# Patient Record
Sex: FEMALE | Race: WHITE | NOT HISPANIC OR LATINO | Employment: FULL TIME
[De-identification: names, ages, dates, MRNs, and addresses within clinical notes are randomized per-mention and may not be internally consistent; named-entity substitution may affect disease eponyms.]

---

## 2017-09-03 ENCOUNTER — HEALTH MAINTENANCE LETTER (OUTPATIENT)
Age: 37
End: 2017-09-03

## 2017-10-01 ENCOUNTER — TRANSFERRED RECORDS (OUTPATIENT)
Dept: MULTI SPECIALTY CLINIC | Facility: CLINIC | Age: 37
End: 2017-10-01

## 2017-10-01 LAB — PAP SMEAR - HIM PATIENT REPORTED: NEGATIVE

## 2019-05-29 LAB
HPV ABSTRACT: NORMAL
PAP SMEAR - HIM PATIENT REPORTED: NEGATIVE

## 2020-01-17 ENCOUNTER — OFFICE VISIT (OUTPATIENT)
Dept: FAMILY MEDICINE | Facility: CLINIC | Age: 40
End: 2020-01-17
Payer: COMMERCIAL

## 2020-01-17 VITALS
TEMPERATURE: 97.9 F | HEART RATE: 68 BPM | OXYGEN SATURATION: 100 % | WEIGHT: 143 LBS | HEIGHT: 69 IN | DIASTOLIC BLOOD PRESSURE: 70 MMHG | SYSTOLIC BLOOD PRESSURE: 108 MMHG | BODY MASS INDEX: 21.18 KG/M2

## 2020-01-17 DIAGNOSIS — E03.9 HYPOTHYROIDISM, UNSPECIFIED TYPE: Primary | ICD-10-CM

## 2020-01-17 PROCEDURE — 99203 OFFICE O/P NEW LOW 30 MIN: CPT | Performed by: PHYSICIAN ASSISTANT

## 2020-01-17 RX ORDER — LEVOTHYROXINE SODIUM 50 UG/1
50 TABLET ORAL DAILY
Qty: 90 TABLET | Refills: 3 | Status: SHIPPED | OUTPATIENT
Start: 2020-01-17 | End: 2020-11-08

## 2020-01-17 RX ORDER — ERGOCALCIFEROL 1.25 MG/1
50000 CAPSULE, LIQUID FILLED ORAL WEEKLY
COMMUNITY
End: 2022-02-25

## 2020-01-17 RX ORDER — LEVOTHYROXINE SODIUM 50 UG/1
50 TABLET ORAL
COMMUNITY
Start: 2018-11-09 | End: 2020-01-17

## 2020-01-17 RX ORDER — MULTIVITAMIN WITH IRON
1 TABLET ORAL DAILY
COMMUNITY
End: 2022-07-06

## 2020-01-17 ASSESSMENT — MIFFLIN-ST. JEOR: SCORE: 1385.08

## 2020-01-17 NOTE — Clinical Note
Please abstract the following data from this visit with this patient into the appropriate field in Epic:Tests that can be patient reported without a hard copy:Pap smear done on this date: 10/2017 (approximately), by this group: Rito sullivan, results were Normal

## 2020-02-23 ENCOUNTER — HEALTH MAINTENANCE LETTER (OUTPATIENT)
Age: 40
End: 2020-02-23

## 2020-03-09 ENCOUNTER — OFFICE VISIT (OUTPATIENT)
Dept: FAMILY MEDICINE | Facility: CLINIC | Age: 40
End: 2020-03-09
Payer: COMMERCIAL

## 2020-03-09 ENCOUNTER — NURSE TRIAGE (OUTPATIENT)
Dept: NURSING | Facility: CLINIC | Age: 40
End: 2020-03-09

## 2020-03-09 VITALS
BODY MASS INDEX: 21.28 KG/M2 | HEART RATE: 58 BPM | SYSTOLIC BLOOD PRESSURE: 94 MMHG | DIASTOLIC BLOOD PRESSURE: 62 MMHG | TEMPERATURE: 96.6 F | WEIGHT: 142 LBS

## 2020-03-09 DIAGNOSIS — E03.9 HYPOTHYROIDISM, UNSPECIFIED TYPE: ICD-10-CM

## 2020-03-09 DIAGNOSIS — J01.00 ACUTE MAXILLARY SINUSITIS, RECURRENCE NOT SPECIFIED: Primary | ICD-10-CM

## 2020-03-09 PROCEDURE — 84443 ASSAY THYROID STIM HORMONE: CPT | Performed by: PHYSICIAN ASSISTANT

## 2020-03-09 PROCEDURE — 99213 OFFICE O/P EST LOW 20 MIN: CPT | Performed by: NURSE PRACTITIONER

## 2020-03-09 PROCEDURE — 36415 COLL VENOUS BLD VENIPUNCTURE: CPT | Performed by: PHYSICIAN ASSISTANT

## 2020-03-09 NOTE — PATIENT INSTRUCTIONS
1. Heat  2. Sudafed  3. Flonase  4. Mucinex or antihistamine    5. Fill Augmentin on Friday if no better (sooner if you get worse before then).

## 2020-03-09 NOTE — PROGRESS NOTES
Subjective     Kaley Hart is a 39 year old female who presents to clinic today for the following health issues:      Acute Illness   Acute illness concerns: sinus congestion   Onset:  Last wed     Fever: no    Chills/Sweats: no    Headache (location?): YES    Sinus Pressure:YES    Conjunctivitis:  no    Ear Pain: YES: both    Rhinorrhea: no    Congestion: no    Sore Throat: YES     Cough: YES    Wheeze: no    Decreased Appetite: YES    Nausea: no    Vomiting: no    Diarrhea:  no    Dysuria/Freq.: no    Fatigue/Achiness: YES    Sick/Strep Exposure: no, but notes she was at the airport last week and is traveling tomorrow      Therapies Tried and outcome: sudafed and nyquil     HPI: Kaley presents today with the complaints of headache, sinus pressure, sore throat, ear pain, dry cough. She denies fever, chills, body aches, wheeze, or SOB. Her symptoms started 6 days ago, and she reports that she feels better today than she did at illness onset. OTC remedies minimally helpful.      Patient Active Problem List   Diagnosis     Contraceptive management     Other acne     Hematuria     Past Surgical History:   Procedure Laterality Date     NO HISTORY OF SURGERY         Social History     Tobacco Use     Smoking status: Never Smoker     Smokeless tobacco: Never Used   Substance Use Topics     Alcohol use: Yes     Comment: occasionally     Family History   Problem Relation Age of Onset     Other Cancer Mother         ovarian     Skin Cancer Mother      Diabetes Father              Reviewed and updated as needed this visit by Provider  Tobacco  Allergies  Meds  Problems  Med Hx  Surg Hx  Fam Hx         Review of Systems   ROS COMP: Constitutional, HEENT, pulmonary systems are negative, except as otherwise noted.      Objective    BP 94/62 (BP Location: Left arm, Patient Position: Chair, Cuff Size: Adult Regular)   Pulse 58   Temp 96.6  F (35.9  C) (Tympanic)   Wt 64.4 kg (142 lb)   LMP 02/23/2020    BMI 21.28 kg/m    Body mass index is 21.28 kg/m .  Physical Exam   GENERAL: healthy, alert and no distress  HENT: ear canals normal; TMs- bilaterally with bulge and mucoid fluid noted behind; nose and mouth without ulcers or lesions  NECK: no adenopathy, no asymmetry, masses, or scars and thyroid normal to palpation  RESP: Lungs clear to auscultation - no rales, rhonchi or wheezes; No findings to suggest focal / lobar consolidation; respiratory  CV: regular rate and rhythm, normal S1 S2, no S3 or S4, no murmur, click or rub, no peripheral edema and peripheral pulses strong  NEURO: Normal strength and tone, mentation intact and speech normal    Diagnostic Test Results:  none         Assessment & Plan     Kaley was seen today for sinus problem.    Diagnoses and all orders for this visit:    Acute maxillary sinusitis, recurrence not specified  Comment: No red flags to prompt suspicion of pneumonia or other potentially-invasive respiratory process. History and exam suggest URI / sinusitis of indeterminate etiology. Given symptoms have been present for only 6 days, I favor viral etiology. However, I have prescribed an on-call Augmentin Rx to her pharmacy to be filled only if her symptoms persist beyond another 4-5 days (she agrees to wait to fill this). Symptomatic cares (see patient instructions) and follow up precautions discussed in detail. Vitally-stable, afebrile, non-toxic in appearance, and with good oxygenation today. Kaley acknowledges and demonstrates understanding of circumstances under which care should be sought urgently or emergently. Follow up as discussed. Discussed risks, benefits, alternatives, potential side effects, and proper administration of new medication / treatment. Agrees with plan of care. All questions answered.   -     amoxicillin-clavulanate (AUGMENTIN) 875-125 MG tablet; Take 1 tablet by mouth 2 times daily for 7 days        See Patient Instructions    Return in about 1 week (around  3/16/2020) for persistent or worsening symptoms.    Merritt Murphy NP  [facility] [place]

## 2020-03-09 NOTE — TELEPHONE ENCOUNTER
Caller has sinus congestion and earache and wants to be seen before she leaves tomorrow  [place]   no  international travel or exposure for [place]    Triage protocol reviewed   advised to be seen within  24 hrs ; call transferred to  to arrange  clinic appointment ; advised to gabriella  Seen in [facility]   understand and will comply   Call transferred to    Floresita Santillan RN  FNA      Additional Information    Negative: Difficulty breathing, and not from stuffy nose (e.g., not relieved by cleaning out the nose)    Negative: Sounds like a life-threatening emergency to the triager    Negative: SEVERE headache and has fever    Negative: Patient sounds very sick or weak to the triager    Negative: SEVERE sinus pain    Negative: Severe headache    Negative: Redness or swelling on the cheek, forehead, or around the eye    Negative: Fever > 103 F (39.4 C)    Negative: Fever > 101 F (38.3 C) and over 60 years of age    Negative: Fever > 100.0 F (37.8 C) and has diabetes mellitus or a weak immune system (e.g., HIV positive, cancer chemotherapy, organ transplant, splenectomy, chronic steroids)    Negative: Fever > 100.0 F (37.8 C) and bedridden (e.g., nursing home patient, stroke, chronic illness, recovering from surgery)    Negative: Fever present > 3 days (72 hours)    Negative: Fever returns after gone for over 24 hours and symptoms worse or not improved    Negative: Sinus pain (not just congestion) and fever    Earache    Protocols used: SINUS PAIN AND CONGESTION-A-OH

## 2020-03-10 LAB — TSH SERPL DL<=0.005 MIU/L-ACNC: 1.55 MU/L (ref 0.4–4)

## 2020-03-12 NOTE — RESULT ENCOUNTER NOTE
Kaley-  Here are your recent results.     Your TSH ( thyroid stimulating hormone) is normal indicating that you are taking the appropriate dose of medications. Please continue your current dose and follow up in 1 year    If you have any questions please do not hesitate to contact our office via phone ([phone]) or you may send me a message via [employer] by clicking the contact my Care Team link.      It was a pleasure meeting you and participating in your care!    Thank you,    Surjit Ha MPH, PA-C  [address] [place] [address] [phone]

## 2020-05-26 ENCOUNTER — OFFICE VISIT (OUTPATIENT)
Dept: FAMILY MEDICINE | Facility: CLINIC | Age: 40
End: 2020-05-26
Payer: COMMERCIAL

## 2020-05-26 ENCOUNTER — NURSE TRIAGE (OUTPATIENT)
Dept: NURSING | Facility: CLINIC | Age: 40
End: 2020-05-26

## 2020-05-26 VITALS
WEIGHT: 146 LBS | HEART RATE: 56 BPM | DIASTOLIC BLOOD PRESSURE: 60 MMHG | SYSTOLIC BLOOD PRESSURE: 110 MMHG | OXYGEN SATURATION: 98 % | BODY MASS INDEX: 21.88 KG/M2 | TEMPERATURE: 98.7 F

## 2020-05-26 DIAGNOSIS — H60.333 ACUTE SWIMMER'S EAR OF BOTH SIDES: Primary | ICD-10-CM

## 2020-05-26 PROCEDURE — 99213 OFFICE O/P EST LOW 20 MIN: CPT | Performed by: NURSE PRACTITIONER

## 2020-05-26 RX ORDER — NEOMYCIN SULFATE, POLYMYXIN B SULFATE, HYDROCORTISONE 3.5; 10000; 1 MG/ML; [USP'U]/ML; MG/ML
3 SOLUTION/ DROPS AURICULAR (OTIC) 4 TIMES DAILY
Qty: 10 ML | Refills: 1 | Status: SHIPPED | OUTPATIENT
Start: 2020-05-26 | End: 2020-06-02

## 2020-05-26 RX ORDER — LACTOBACILLUS RHAMNOSUS GG 10B CELL
1 CAPSULE ORAL 2 TIMES DAILY
COMMUNITY
End: 2023-06-29

## 2020-05-26 NOTE — TELEPHONE ENCOUNTER
"    Call form pt       CC:  Ear pain - both ears       She is not sure if allergy related        Initially R ear - muffled - then the L     Now both are \"plugged and painful\"      Took some OTC allergy meds this am - seems to be a little bit better     She has also tried some H2O2 to clean out as well      Pain is 4 /10 now but has been what she would describe as   \"Shooting pains\" in both ears          A/P:   >  OK for visit today  - over to scheduling - will see if provider is able to see as \"in-person\"  And arrange for that if possible     > care advice as noted otherwise -       Ron Baugh RN     COVID 19 Nurse Triage Plan/Patient Instructions    Please be aware that novel coronavirus (COVID-19) may be circulating in the community. If you develop symptoms such as fever, cough, or SOB or if you have concerns about the presence of another infection including coronavirus (COVID-19), please contact your health care provider or visit [email or web address].     Disposition/Instructions    Patient to have scheduled Telephone Visit with a provider. Follow System Ambulatory Workflow for COVID 19.     The clinic staff will assist you to schedule an appointment to complete the Telephone Visit with a provider during normal clinic hours.       Call Back If: Your symptoms worsen before you are able to complete your Telephone Visit with a provider.    Thank you for limiting contact with others, wearing a simple mask to cover your cough, practice good hand hygiene habits and accessing our virtual services where possible to limit the spread of this virus.    For more information about COVID19 and options for caring for yourself at home, please visit the CDC website at https://www.cdc.gov/coronavirus/2019-ncov/about/steps-when-sick.html  For more options for care at [facility], please visit our website at [email or web address]    For more information, please use the [place] Department of Health " COVID-19 Website: https://[email or web address]/diseases/coronavirus/index.html  [place] Department of Health ([facility]) COVID-19 Hotlines (Interpreters available):      Health questions: Phone Number: [phone] or [phone] and Hours: 7 a.m. to 7 p.m.    Schools and  questions: Phone Number: [phone] or [phone] and Hours 7 a.m. to 7 p.m.                       Additional Information    Negative: Sounds like a life-threatening emergency to the triager    Negative: Moving the earlobe or touching the ear clearly increases the pain    Negative: Pink or red swelling behind the ear    Negative: Stiff neck (can't touch chin to chest)    Negative: Patient sounds very sick or weak to the triager    Severe earache pain    Protocols used: EARACHE-A-OH

## 2020-05-26 NOTE — PROGRESS NOTES
Subjective     Kaley Hart is a 39 year old female who presents to clinic today for the following health issues:      Acute Illness   Acute illness concerns:  Ear pain   Onset:  Last week     Fever: no     Chills/Sweats: no     Headache (location?): no     Sinus Pressure:no    Conjunctivitis:  No but watery eyes     Ear Pain: YES: bilateral    Rhinorrhea: no     Congestion: no     Sore Throat: no      Cough: no    Wheeze: no     Decreased Appetite: no     Nausea: no     Vomiting: no     Diarrhea:  no     Dysuria/Freq.: no     Fatigue/Achiness: no     Sick/Strep Exposure: no      Therapies Tried and outcome: allergy medication     HPI: Kaley presents today with the complaint of bilateral ear pain. She first noted this about a week ago and it has gotten progressively worse since onset. No recent history of swimming or retained water in her ears. No recent URI. No drainage. She does find that her hearing is less acute. Pain is located behind ears and within canals. No respiratory complaints. No fever, chills.     Patient Active Problem List   Diagnosis     Contraceptive management     Other acne     Hematuria     Past Surgical History:   Procedure Laterality Date     NO HISTORY OF SURGERY         Social History     Tobacco Use     Smoking status: Never Smoker     Smokeless tobacco: Never Used   Substance Use Topics     Alcohol use: Yes     Comment: occasionally     Family History   Problem Relation Age of Onset     Other Cancer Mother         ovarian     Skin Cancer Mother      Diabetes Father            Reviewed and updated as needed this visit by Provider  Tobacco  Allergies  Meds  Problems  Med Hx  Surg Hx  Fam Hx         Review of Systems   Constitutional, HEENT, pulmonary, neuro, skin systems are negative, except as otherwise noted.      Objective    /60   Pulse 56   Temp 98.7  F (37.1  C) (Tympanic)   Wt 66.2 kg (146 lb)   LMP 05/23/2020   SpO2 98%   BMI 21.88 kg/m    Body mass  index is 21.88 kg/m .  Physical Exam   GENERAL: healthy, alert and no distress  HENT: Ear canals erythematous with with yellowish, clumpy discharge filling them; TMs unable to be visualized; No nose and mouth without ulcers or lesions  NECK: no adenopathy, no asymmetry, masses, or scars and thyroid normal to palpation  RESP: lungs clear to auscultation - no rales, rhonchi or wheezes  CV: regular rate and rhythm, normal S1 S2, no S3 or S4, no murmur, click or rub, no peripheral edema and peripheral pulses strong  NEURO: Normal strength and tone, mentation intact and speech normal    Diagnostic Test Results:  Labs reviewed in Epic        Assessment & Plan     Kaley was seen today for ear problem.    Diagnoses and all orders for this visit:    Acute swimmer's ear of both sides  Comment: Exam suggests otitis externa. Will treat with topical antibiotic for one week. Unfortunately, I was unable to visualize her TMs, so I wasn't able to assess for AOM. She will try the drops and follow up in a few days if she fails to note relief. Discussed reasons to call or return to clinic. Kaley acknowledges and demonstrates understanding of circumstances under which care should be sought urgently or emergently. Follow up as discussed. Discussed risks, benefits, alternatives, potential side effects, and proper administration of new medication / treatment. Agrees with plan of care. All questions answered.   -     neomycin-polymyxin-hydrocortisone (CORTISPORIN) 3.5-48827-7 otic solution; Place 3 drops in ear(s) 4 times daily for 7 days      See Patient Instructions    Return in about 1 week (around 6/2/2020) for persistent or worsening symptoms.    Merritt Murphy NP  [facility] [place]

## 2020-06-03 ENCOUNTER — MYC MEDICAL ADVICE (OUTPATIENT)
Dept: FAMILY MEDICINE | Facility: CLINIC | Age: 40
End: 2020-06-03

## 2020-06-29 NOTE — PROGRESS NOTES
Subjective     Kaley Hart is a 39 year old female who presents to clinic today for the following health issues:      Concern - Pt has ongoing bilateral ear fullness/pain as previously in OV on 6/3     HPI: Kaley presents today for bilateral ear pain / fullness. She was seen in late May for bilateral infective otitis externa. This resolved with a course of Cortisporin drops. However, she recently noted the development of bilateral ear pain / fullness and sinus pressure / pain. She feels like these symptoms may be allergy-driven. She is taking Zyrtec and Flonase at present, but these don't appear to be helping much. No fever, chills.     Patient Active Problem List   Diagnosis     Contraceptive management     Other acne     Hematuria     Past Surgical History:   Procedure Laterality Date     NO HISTORY OF SURGERY         Social History     Tobacco Use     Smoking status: Never Smoker     Smokeless tobacco: Never Used   Substance Use Topics     Alcohol use: Yes     Comment: occasionally     Family History   Problem Relation Age of Onset     Other Cancer Mother         ovarian     Skin Cancer Mother      Diabetes Father            Reviewed and updated as needed this visit by Provider  Tobacco  Allergies  Meds  Problems  Med Hx  Surg Hx  Fam Hx         Review of Systems   Constitutional, HEENT, pulmonary, neuro systems are negative, except as otherwise noted.      Objective    BP 96/62 (BP Location: Left arm, Patient Position: Chair, Cuff Size: Adult Regular)   Pulse 66   Temp 97.3  F (36.3  C) (Tympanic)   Wt 67.1 kg (148 lb)   LMP 06/23/2020   BMI 22.18 kg/m    Body mass index is 22.18 kg/m .  Physical Exam   GENERAL: healthy, alert and no distress  HENT: ear canals normal; TMs- bilaterally with bulge and mucoid fluid noted behind; nose and mouth without ulcers or lesions  NECK: no adenopathy, no asymmetry, masses, or scars and thyroid normal to palpation  RESP: lungs clear to auscultation -  no rales, rhonchi or wheezes  CV: regular rate and rhythm, normal S1 S2, no S3 or S4, no murmur, click or rub, no peripheral edema and peripheral pulses strong  NEURO: Normal strength and tone, mentation intact and speech normal    Diagnostic Test Results:  Labs reviewed in Epic        Assessment & Plan     Kaley was seen today for ear problem.    Diagnoses and all orders for this visit:    Middle ear effusion, bilateral  Comment: No evidence of infectious process behind TMs. Otitis externa also resolved. Presumably-sterile effusions noted. Suggested antihistamine, oral decongestant, Flonase, and heat for a couple weeks. If no resolution, she will see ENT for further workup / treatment. Discussed reasons to call or return to clinic. Kaley acknowledges and demonstrates understanding of circumstances under which care should be sought urgently or emergently. Follow up as discussed.   -     OTOLARYNGOLOGY REFERRAL      See Patient Instructions    Return in about 3 weeks (around 7/21/2020).    Merritt Murphy NP  [facility] [place]

## 2020-06-29 NOTE — TELEPHONE ENCOUNTER
Please see [employer] message below and advise.   Nancy Lopez RN   [facility] - Triage

## 2020-06-30 ENCOUNTER — OFFICE VISIT (OUTPATIENT)
Dept: FAMILY MEDICINE | Facility: CLINIC | Age: 40
End: 2020-06-30
Payer: COMMERCIAL

## 2020-06-30 VITALS
BODY MASS INDEX: 22.18 KG/M2 | WEIGHT: 148 LBS | HEART RATE: 66 BPM | TEMPERATURE: 97.3 F | DIASTOLIC BLOOD PRESSURE: 62 MMHG | SYSTOLIC BLOOD PRESSURE: 96 MMHG

## 2020-06-30 DIAGNOSIS — H65.93 MIDDLE EAR EFFUSION, BILATERAL: Primary | ICD-10-CM

## 2020-06-30 PROCEDURE — 99213 OFFICE O/P EST LOW 20 MIN: CPT | Performed by: NURSE PRACTITIONER

## 2020-11-06 ENCOUNTER — OFFICE VISIT (OUTPATIENT)
Dept: FAMILY MEDICINE | Facility: CLINIC | Age: 40
End: 2020-11-06
Payer: COMMERCIAL

## 2020-11-06 VITALS
WEIGHT: 147 LBS | TEMPERATURE: 98.2 F | DIASTOLIC BLOOD PRESSURE: 58 MMHG | OXYGEN SATURATION: 100 % | SYSTOLIC BLOOD PRESSURE: 96 MMHG | BODY MASS INDEX: 21.77 KG/M2 | HEART RATE: 73 BPM | HEIGHT: 69 IN

## 2020-11-06 DIAGNOSIS — E06.3 HYPOTHYROIDISM DUE TO HASHIMOTO'S THYROIDITIS: ICD-10-CM

## 2020-11-06 DIAGNOSIS — N39.3 FEMALE STRESS INCONTINENCE: ICD-10-CM

## 2020-11-06 DIAGNOSIS — L84 CORN OR CALLUS: ICD-10-CM

## 2020-11-06 DIAGNOSIS — Z23 NEED FOR PROPHYLACTIC VACCINATION AND INOCULATION AGAINST INFLUENZA: ICD-10-CM

## 2020-11-06 DIAGNOSIS — E03.9 HYPOTHYROIDISM, UNSPECIFIED TYPE: ICD-10-CM

## 2020-11-06 DIAGNOSIS — Z00.00 ENCOUNTER FOR ROUTINE ADULT HEALTH EXAMINATION WITHOUT ABNORMAL FINDINGS: Primary | ICD-10-CM

## 2020-11-06 DIAGNOSIS — Z13.1 SCREENING FOR DIABETES MELLITUS: ICD-10-CM

## 2020-11-06 DIAGNOSIS — Z13.220 SCREENING FOR LIPID DISORDERS: ICD-10-CM

## 2020-11-06 LAB
CHOLEST SERPL-MCNC: 163 MG/DL
GLUCOSE SERPL-MCNC: 87 MG/DL (ref 70–99)
HDLC SERPL-MCNC: 49 MG/DL
LDLC SERPL CALC-MCNC: 101 MG/DL
NONHDLC SERPL-MCNC: 114 MG/DL
T4 FREE SERPL-MCNC: 1.44 NG/DL (ref 0.76–1.46)
TRIGL SERPL-MCNC: 65 MG/DL
TSH SERPL DL<=0.005 MIU/L-ACNC: 0.14 MU/L (ref 0.4–4)

## 2020-11-06 PROCEDURE — 90686 IIV4 VACC NO PRSV 0.5 ML IM: CPT | Performed by: NURSE PRACTITIONER

## 2020-11-06 PROCEDURE — 99213 OFFICE O/P EST LOW 20 MIN: CPT | Mod: 25 | Performed by: NURSE PRACTITIONER

## 2020-11-06 PROCEDURE — 99395 PREV VISIT EST AGE 18-39: CPT | Mod: 25 | Performed by: NURSE PRACTITIONER

## 2020-11-06 PROCEDURE — 36415 COLL VENOUS BLD VENIPUNCTURE: CPT | Performed by: NURSE PRACTITIONER

## 2020-11-06 PROCEDURE — 84439 ASSAY OF FREE THYROXINE: CPT | Performed by: NURSE PRACTITIONER

## 2020-11-06 PROCEDURE — 90471 IMMUNIZATION ADMIN: CPT | Performed by: NURSE PRACTITIONER

## 2020-11-06 PROCEDURE — 84443 ASSAY THYROID STIM HORMONE: CPT | Performed by: NURSE PRACTITIONER

## 2020-11-06 PROCEDURE — 82947 ASSAY GLUCOSE BLOOD QUANT: CPT | Performed by: NURSE PRACTITIONER

## 2020-11-06 PROCEDURE — 80061 LIPID PANEL: CPT | Performed by: NURSE PRACTITIONER

## 2020-11-06 ASSESSMENT — MIFFLIN-ST. JEOR: SCORE: 1403.23

## 2020-11-06 NOTE — PROGRESS NOTES
"   SUBJECTIVE:   CC: Kaely Hart is an 39 year old woman who presents for preventive health visit.     Patient has been advised of split billing requirements and indicates understanding: Yes  Healthy Habits:    Getting at least 3 servings of Calcium per day:  Yes    Bi-annual eye exam:  Yes    Dental care twice a year:  Yes    Sleep apnea or symptoms of sleep apnea:  None    Diet:  Regular (no restrictions)    Frequency of exercise:  1 day/week    Taking medications regularly:  Yes    Barriers to taking medications:  None    Medication side effects:  None    PHQ-2 Total Score:    HPI:     Kaley would also like to discuss two other issues.     1. Hard, painful lumps over her big toe IP joints bilaterally. She was hiking last year and lost her toenails due to friction with footwear. Her nails have finally grown back in (took one year), but now she is struggling with painful growths over her \"knuckles\" of her great toes bilaterally. No bleeding or drainage.     2. Since her last pregnancy, she has been struggling with some stress incontinence. She states that it get particularly bad while working out, so she often works out at home. No dysuria, hematuria, or other symptoms of UTI.     Today's PHQ-2 Score:   PHQ-2 ( 1999 Pfizer) 11/6/2020   Q1: Little interest or pleasure in doing things 0   Q2: Feeling down, depressed or hopeless 0   PHQ-2 Score 0   Q1: Little interest or pleasure in doing things Not at all   Q2: Feeling down, depressed or hopeless Not at all   PHQ-2 Score 0       Abuse: Current or Past (Physical, Sexual or Emotional) - No  Do you feel safe in your environment? Yes        Social History     Tobacco Use     Smoking status: Never Smoker     Smokeless tobacco: Never Used   Substance Use Topics     Alcohol use: Yes     Comment: occasionally     If you drink alcohol do you typically have >3 drinks per day or >7 drinks per week? No    Alcohol Use 11/6/2020   Prescreen: >3 drinks/day or >7 " "drinks/week? No   Prescreen: >3 drinks/day or >7 drinks/week? -   No flowsheet data found.    Reviewed orders with patient.  Reviewed health maintenance and updated orders accordingly - Yes  Lab work is in process      Pertinent mammograms are reviewed under the imaging tab.  History of abnormal Pap smear: NO - age 30-65 PAP every 5 years with negative HPV co-testing recommended     Reviewed and updated as needed this visit by clinical staff  Tobacco  Allergies  Meds  Problems  Med Hx  Surg Hx  Fam Hx          Reviewed and updated as needed this visit by Provider  Tobacco  Allergies  Meds  Problems  Med Hx  Surg Hx  Fam Hx             Review of Systems  CONSTITUTIONAL: NEGATIVE for fever, chills, change in weight  INTEGUMENTARU/SKIN: NEGATIVE for worrisome rashes, moles or lesions  EYES: NEGATIVE for vision changes or irritation  ENT: NEGATIVE for ear, mouth and throat problems  RESP: NEGATIVE for significant cough or SOB  BREAST: NEGATIVE for masses, tenderness or discharge  CV: NEGATIVE for chest pain, palpitations or peripheral edema  GI: NEGATIVE for nausea, abdominal pain, heartburn, or change in bowel habits  : See HPI  MUSCULOSKELETAL: See HPI  NEURO: NEGATIVE for weakness, dizziness or paresthesias  PSYCHIATRIC: NEGATIVE for changes in mood or affect     OBJECTIVE:   BP 96/58   Pulse 73   Temp 98.2  F (36.8  C) (Tympanic)   Ht 1.74 m (5' 8.5\")   Wt 66.7 kg (147 lb)   LMP 10/16/2020   SpO2 100%   BMI 22.03 kg/m    Physical Exam  GENERAL: healthy, alert and no distress  EYES: Eyes grossly normal to inspection, PERRL and conjunctivae and sclerae normal  HENT: ear canals and TM's normal, nose and mouth without ulcers or lesions  NECK: no adenopathy, no asymmetry, masses, or scars and thyroid normal to palpation  RESP: lungs clear to auscultation - no rales, rhonchi or wheezes  CV: regular rate and rhythm, normal S1 S2, no S3 or S4, no murmur, click or rub, no peripheral edema and " peripheral pulses strong  ABDOMEN: soft, nontender, no hepatosplenomegaly, no masses and bowel sounds normal  MS: no gross musculoskeletal defects noted, no edema  SKIN: Great toes bilaterally - [place], tender keratinized masses directly over IP joints. No bleeding or drainage. Appearance consistent with corn / callus versus mucus cyst.  NEURO: Normal strength and tone, mentation intact and speech normal  PSYCH: mentation appears normal, affect normal/bright    Diagnostic Test Results:  Labs reviewed in Epic  No results found for this or any previous visit (from the past 24 hour(s)).    ASSESSMENT/PLAN:   Kaley was seen today for physical and imm/inj.    Diagnoses and all orders for this visit:    Encounter for routine adult health examination without abnormal findings    Corn or callus  Comment: Viral wart(s) unlikely given appearance and bilateral nature (and because they are found over a high-friction surface). Could be mucus cysts, but stratified / keratinized appearance favors corn / callus. Suggest softening skin (file and soak) and using OTC remedies and pads first. If this is not effective, will consider in-office destruction with LN2 versus podiatry referral. She agrees with this approach.     Female stress incontinence  Comment: Likely due to pelvic floor muscle changes after childbirth. Recommended pelvic floor therapy. She will reach out to me in the coming weeks and ask for this referral if she is not able to see her OB/GYN imminently.     Hypothyroidism due to Hashimoto's thyroiditis  -     TSH with free T4 reflex    Screening for diabetes mellitus  -     Glucose    Screening for lipid disorders  -     Lipid panel reflex to direct LDL Fasting    Need for prophylactic vaccination and inoculation against influenza  -     INFLUENZA VACCINE IM > 6 MONTHS VALENT IIV4 [38970]        Patient has been advised of split billing requirements and indicates understanding: Yes  COUNSELING:  Reviewed preventive  "health counseling, as reflected in patient instructions    Estimated body mass index is 22.03 kg/m  as calculated from the following:    Height as of this encounter: 1.74 m (5' 8.5\").    Weight as of this encounter: 66.7 kg (147 lb).        She reports that she has never smoked. She has never used smokeless tobacco.      Counseling Resources:  ATP IV Guidelines  Pooled Cohorts Equation Calculator  Breast Cancer Risk Calculator  BRCA-Related Cancer Risk Assessment: FHS-7 Tool  FRAX Risk Assessment  ICSI Preventive Guidelines  Dietary Guidelines for Americans, 2010  USDA's MyPlate  ASA Prophylaxis  Lung CA Screening    Merritt Murphy NP  [facility]  "

## 2020-11-08 RX ORDER — LEVOTHYROXINE SODIUM 25 UG/1
25 TABLET ORAL DAILY
Qty: 90 TABLET | Refills: 0 | Status: SHIPPED | OUTPATIENT
Start: 2020-11-08 | End: 2021-02-06

## 2020-11-21 ENCOUNTER — MYC MEDICAL ADVICE (OUTPATIENT)
Dept: FAMILY MEDICINE | Facility: CLINIC | Age: 40
End: 2020-11-21

## 2020-11-23 NOTE — TELEPHONE ENCOUNTER
Routing to Merritt Murphy, have you seen this form?    .Kalpana STEEN    [facility] [place]

## 2020-11-24 NOTE — TELEPHONE ENCOUNTER
Patient sent form, form has been completed and signed scanned to medical records and sent to patient.    .Kalpana STEEN    [facility] [place]

## 2021-01-15 ENCOUNTER — HEALTH MAINTENANCE LETTER (OUTPATIENT)
Age: 41
End: 2021-01-15

## 2021-01-30 ENCOUNTER — HOSPITAL ENCOUNTER (OUTPATIENT)
Facility: CLINIC | Age: 41
Discharge: HOME | End: 2021-01-30
Attending: INTERNAL MEDICINE | Admitting: SURGERY
Payer: COMMERCIAL

## 2021-01-30 ENCOUNTER — ANESTHESIA (OUTPATIENT)
Dept: SURGERY | Facility: CLINIC | Age: 41
End: 2021-01-30
Payer: COMMERCIAL

## 2021-01-30 ENCOUNTER — APPOINTMENT (OUTPATIENT)
Dept: CT IMAGING | Facility: CLINIC | Age: 41
End: 2021-01-30
Attending: INTERNAL MEDICINE
Payer: COMMERCIAL

## 2021-01-30 ENCOUNTER — ANESTHESIA EVENT (OUTPATIENT)
Dept: SURGERY | Facility: CLINIC | Age: 41
End: 2021-01-30
Payer: COMMERCIAL

## 2021-01-30 VITALS
WEIGHT: 146 LBS | SYSTOLIC BLOOD PRESSURE: 115 MMHG | HEART RATE: 68 BPM | TEMPERATURE: 97.7 F | DIASTOLIC BLOOD PRESSURE: 80 MMHG | RESPIRATION RATE: 16 BRPM | OXYGEN SATURATION: 100 % | BODY MASS INDEX: 21.88 KG/M2

## 2021-01-30 DIAGNOSIS — K35.30 ACUTE APPENDICITIS WITH LOCALIZED PERITONITIS, WITHOUT PERFORATION, ABSCESS, OR GANGRENE: Primary | ICD-10-CM

## 2021-01-30 LAB
ALBUMIN UR-MCNC: NEGATIVE MG/DL
AMORPH CRY #/AREA URNS HPF: ABNORMAL /HPF
ANION GAP SERPL CALCULATED.3IONS-SCNC: 4 MMOL/L (ref 3–14)
APPEARANCE UR: ABNORMAL
BASOPHILS # BLD AUTO: 0 10E9/L (ref 0–0.2)
BASOPHILS NFR BLD AUTO: 0.3 %
BILIRUB UR QL STRIP: NEGATIVE
BUN SERPL-MCNC: 12 MG/DL (ref 7–30)
CALCIUM SERPL-MCNC: 8.7 MG/DL (ref 8.5–10.1)
CHLORIDE SERPL-SCNC: 106 MMOL/L (ref 94–109)
CO2 SERPL-SCNC: 28 MMOL/L (ref 20–32)
COLOR UR AUTO: YELLOW
CREAT SERPL-MCNC: 0.82 MG/DL (ref 0.52–1.04)
DIFFERENTIAL METHOD BLD: NORMAL
EOSINOPHIL # BLD AUTO: 0 10E9/L (ref 0–0.7)
EOSINOPHIL NFR BLD AUTO: 0.5 %
ERYTHROCYTE [DISTWIDTH] IN BLOOD BY AUTOMATED COUNT: 11.9 % (ref 10–15)
GFR SERPL CREATININE-BSD FRML MDRD: 90 ML/MIN/{1.73_M2}
GLUCOSE SERPL-MCNC: 87 MG/DL (ref 70–99)
GLUCOSE UR STRIP-MCNC: NEGATIVE MG/DL
HCG SERPL QL: NEGATIVE
HCT VFR BLD AUTO: 39.3 % (ref 35–47)
HGB BLD-MCNC: 13.7 G/DL (ref 11.7–15.7)
HGB UR QL STRIP: NEGATIVE
IMM GRANULOCYTES # BLD: 0 10E9/L (ref 0–0.4)
IMM GRANULOCYTES NFR BLD: 0.3 %
KETONES UR STRIP-MCNC: NEGATIVE MG/DL
LABORATORY COMMENT REPORT: NORMAL
LEUKOCYTE ESTERASE UR QL STRIP: ABNORMAL
LYMPHOCYTES # BLD AUTO: 1.2 10E9/L (ref 0.8–5.3)
LYMPHOCYTES NFR BLD AUTO: 15.7 %
MCH RBC QN AUTO: 32.3 PG (ref 26.5–33)
MCHC RBC AUTO-ENTMCNC: 34.9 G/DL (ref 31.5–36.5)
MCV RBC AUTO: 93 FL (ref 78–100)
MONOCYTES # BLD AUTO: 0.4 10E9/L (ref 0–1.3)
MONOCYTES NFR BLD AUTO: 5.4 %
NEUTROPHILS # BLD AUTO: 6.2 10E9/L (ref 1.6–8.3)
NEUTROPHILS NFR BLD AUTO: 77.8 %
NITRATE UR QL: NEGATIVE
NRBC # BLD AUTO: 0 10*3/UL
NRBC BLD AUTO-RTO: 0 /100
PH UR STRIP: 7.5 PH (ref 5–7)
PLATELET # BLD AUTO: 186 10E9/L (ref 150–450)
POTASSIUM SERPL-SCNC: 3.3 MMOL/L (ref 3.4–5.3)
RBC # BLD AUTO: 4.24 10E12/L (ref 3.8–5.2)
RBC #/AREA URNS AUTO: 1 /HPF (ref 0–2)
SARS-COV-2 RNA RESP QL NAA+PROBE: NEGATIVE
SODIUM SERPL-SCNC: 138 MMOL/L (ref 133–144)
SOURCE: ABNORMAL
SP GR UR STRIP: 1.02 (ref 1–1.03)
SPECIMEN SOURCE: NORMAL
SQUAMOUS #/AREA URNS AUTO: 5 /HPF (ref 0–1)
UROBILINOGEN UR STRIP-MCNC: NORMAL MG/DL (ref 0–2)
WBC # BLD AUTO: 7.9 10E9/L (ref 4–11)
WBC #/AREA URNS AUTO: 4 /HPF (ref 0–5)

## 2021-01-30 PROCEDURE — 258N000001 HC RX 258: Performed by: SURGERY

## 2021-01-30 PROCEDURE — 74176 CT ABD & PELVIS W/O CONTRAST: CPT

## 2021-01-30 PROCEDURE — 250N000011 HC RX IP 250 OP 636: Performed by: INTERNAL MEDICINE

## 2021-01-30 PROCEDURE — 999N000141 HC STATISTIC PRE-PROCEDURE NURSING ASSESSMENT: Performed by: SURGERY

## 2021-01-30 PROCEDURE — 85025 COMPLETE CBC W/AUTO DIFF WBC: CPT | Performed by: INTERNAL MEDICINE

## 2021-01-30 PROCEDURE — 250N000011 HC RX IP 250 OP 636: Performed by: ANESTHESIOLOGY

## 2021-01-30 PROCEDURE — 250N000011 HC RX IP 250 OP 636: Performed by: NURSE ANESTHETIST, CERTIFIED REGISTERED

## 2021-01-30 PROCEDURE — 710N000012 HC RECOVERY PHASE 2, PER MINUTE: Performed by: SURGERY

## 2021-01-30 PROCEDURE — 258N000003 HC RX IP 258 OP 636: Performed by: ANESTHESIOLOGY

## 2021-01-30 PROCEDURE — 250N000009 HC RX 250: Performed by: NURSE ANESTHETIST, CERTIFIED REGISTERED

## 2021-01-30 PROCEDURE — 360N000076 HC SURGERY LEVEL 3, PER MIN: Performed by: SURGERY

## 2021-01-30 PROCEDURE — 250N000009 HC RX 250: Performed by: SURGERY

## 2021-01-30 PROCEDURE — 81001 URINALYSIS AUTO W/SCOPE: CPT | Performed by: INTERNAL MEDICINE

## 2021-01-30 PROCEDURE — 99204 OFFICE O/P NEW MOD 45 MIN: CPT | Mod: 57 | Performed by: SURGERY

## 2021-01-30 PROCEDURE — 96365 THER/PROPH/DIAG IV INF INIT: CPT

## 2021-01-30 PROCEDURE — 250N000013 HC RX MED GY IP 250 OP 250 PS 637: Performed by: SURGERY

## 2021-01-30 PROCEDURE — 84703 CHORIONIC GONADOTROPIN ASSAY: CPT | Performed by: INTERNAL MEDICINE

## 2021-01-30 PROCEDURE — C9803 HOPD COVID-19 SPEC COLLECT: HCPCS

## 2021-01-30 PROCEDURE — 87635 SARS-COV-2 COVID-19 AMP PRB: CPT | Performed by: INTERNAL MEDICINE

## 2021-01-30 PROCEDURE — 80048 BASIC METABOLIC PNL TOTAL CA: CPT | Performed by: INTERNAL MEDICINE

## 2021-01-30 PROCEDURE — 88304 TISSUE EXAM BY PATHOLOGIST: CPT | Mod: 26 | Performed by: PATHOLOGY

## 2021-01-30 PROCEDURE — 710N000009 HC RECOVERY PHASE 1, LEVEL 1, PER MIN: Performed by: SURGERY

## 2021-01-30 PROCEDURE — 370N000017 HC ANESTHESIA TECHNICAL FEE, PER MIN: Performed by: SURGERY

## 2021-01-30 PROCEDURE — 88304 TISSUE EXAM BY PATHOLOGIST: CPT | Mod: TC | Performed by: SURGERY

## 2021-01-30 PROCEDURE — 272N000001 HC OR GENERAL SUPPLY STERILE: Performed by: SURGERY

## 2021-01-30 PROCEDURE — 99285 EMERGENCY DEPT VISIT HI MDM: CPT | Mod: 25

## 2021-01-30 RX ORDER — ONDANSETRON 4 MG/1
4 TABLET, ORALLY DISINTEGRATING ORAL EVERY 30 MIN PRN
Status: DISCONTINUED | OUTPATIENT
Start: 2021-01-30 | End: 2021-01-30 | Stop reason: HOSPADM

## 2021-01-30 RX ORDER — NALOXONE HYDROCHLORIDE 0.4 MG/ML
0.2 INJECTION, SOLUTION INTRAMUSCULAR; INTRAVENOUS; SUBCUTANEOUS
Status: DISCONTINUED | OUTPATIENT
Start: 2021-01-30 | End: 2021-01-30 | Stop reason: HOSPADM

## 2021-01-30 RX ORDER — ACETAMINOPHEN 325 MG/1
650 TABLET ORAL
Status: DISCONTINUED | OUTPATIENT
Start: 2021-01-30 | End: 2021-01-30 | Stop reason: HOSPADM

## 2021-01-30 RX ORDER — NALOXONE HYDROCHLORIDE 0.4 MG/ML
0.4 INJECTION, SOLUTION INTRAMUSCULAR; INTRAVENOUS; SUBCUTANEOUS
Status: DISCONTINUED | OUTPATIENT
Start: 2021-01-30 | End: 2021-01-30 | Stop reason: HOSPADM

## 2021-01-30 RX ORDER — HYDROMORPHONE HYDROCHLORIDE 1 MG/ML
0.5 INJECTION, SOLUTION INTRAMUSCULAR; INTRAVENOUS; SUBCUTANEOUS
Status: DISCONTINUED | OUTPATIENT
Start: 2021-01-30 | End: 2021-01-30 | Stop reason: HOSPADM

## 2021-01-30 RX ORDER — PROPOFOL 10 MG/ML
INJECTION, EMULSION INTRAVENOUS CONTINUOUS PRN
Status: DISCONTINUED | OUTPATIENT
Start: 2021-01-30 | End: 2021-01-30

## 2021-01-30 RX ORDER — FENTANYL CITRATE 50 UG/ML
25-50 INJECTION, SOLUTION INTRAMUSCULAR; INTRAVENOUS
Status: DISCONTINUED | OUTPATIENT
Start: 2021-01-30 | End: 2021-01-30 | Stop reason: HOSPADM

## 2021-01-30 RX ORDER — ONDANSETRON 2 MG/ML
4 INJECTION INTRAMUSCULAR; INTRAVENOUS EVERY 30 MIN PRN
Status: DISCONTINUED | OUTPATIENT
Start: 2021-01-30 | End: 2021-01-30 | Stop reason: HOSPADM

## 2021-01-30 RX ORDER — DEXAMETHASONE SODIUM PHOSPHATE 4 MG/ML
INJECTION, SOLUTION INTRA-ARTICULAR; INTRALESIONAL; INTRAMUSCULAR; INTRAVENOUS; SOFT TISSUE PRN
Status: DISCONTINUED | OUTPATIENT
Start: 2021-01-30 | End: 2021-01-30

## 2021-01-30 RX ORDER — MEPERIDINE HYDROCHLORIDE 25 MG/ML
12.5 INJECTION INTRAMUSCULAR; INTRAVENOUS; SUBCUTANEOUS
Status: DISCONTINUED | OUTPATIENT
Start: 2021-01-30 | End: 2021-01-30 | Stop reason: HOSPADM

## 2021-01-30 RX ORDER — PROPOFOL 10 MG/ML
INJECTION, EMULSION INTRAVENOUS PRN
Status: DISCONTINUED | OUTPATIENT
Start: 2021-01-30 | End: 2021-01-30

## 2021-01-30 RX ORDER — SODIUM CHLORIDE, SODIUM LACTATE, POTASSIUM CHLORIDE, CALCIUM CHLORIDE 600; 310; 30; 20 MG/100ML; MG/100ML; MG/100ML; MG/100ML
INJECTION, SOLUTION INTRAVENOUS CONTINUOUS
Status: DISCONTINUED | OUTPATIENT
Start: 2021-01-30 | End: 2021-01-30 | Stop reason: HOSPADM

## 2021-01-30 RX ORDER — OXYCODONE HYDROCHLORIDE 5 MG/1
5-10 TABLET ORAL EVERY 4 HOURS PRN
Qty: 10 TABLET | Refills: 0 | Status: SHIPPED | OUTPATIENT
Start: 2021-01-30 | End: 2021-03-10

## 2021-01-30 RX ORDER — ONDANSETRON 2 MG/ML
INJECTION INTRAMUSCULAR; INTRAVENOUS PRN
Status: DISCONTINUED | OUTPATIENT
Start: 2021-01-30 | End: 2021-01-30

## 2021-01-30 RX ORDER — NEOSTIGMINE METHYLSULFATE 1 MG/ML
VIAL (ML) INJECTION PRN
Status: DISCONTINUED | OUTPATIENT
Start: 2021-01-30 | End: 2021-01-30

## 2021-01-30 RX ORDER — LIDOCAINE HYDROCHLORIDE 10 MG/ML
INJECTION, SOLUTION INFILTRATION; PERINEURAL PRN
Status: DISCONTINUED | OUTPATIENT
Start: 2021-01-30 | End: 2021-01-30

## 2021-01-30 RX ORDER — HYDROMORPHONE HYDROCHLORIDE 1 MG/ML
.3-.5 INJECTION, SOLUTION INTRAMUSCULAR; INTRAVENOUS; SUBCUTANEOUS EVERY 5 MIN PRN
Status: DISCONTINUED | OUTPATIENT
Start: 2021-01-30 | End: 2021-01-30 | Stop reason: HOSPADM

## 2021-01-30 RX ORDER — GLYCOPYRROLATE 0.2 MG/ML
INJECTION, SOLUTION INTRAMUSCULAR; INTRAVENOUS PRN
Status: DISCONTINUED | OUTPATIENT
Start: 2021-01-30 | End: 2021-01-30

## 2021-01-30 RX ORDER — OXYCODONE HYDROCHLORIDE 5 MG/1
5 TABLET ORAL
Status: COMPLETED | OUTPATIENT
Start: 2021-01-30 | End: 2021-01-30

## 2021-01-30 RX ORDER — FENTANYL CITRATE 50 UG/ML
INJECTION, SOLUTION INTRAMUSCULAR; INTRAVENOUS PRN
Status: DISCONTINUED | OUTPATIENT
Start: 2021-01-30 | End: 2021-01-30

## 2021-01-30 RX ORDER — BUPIVACAINE HYDROCHLORIDE AND EPINEPHRINE 5; 5 MG/ML; UG/ML
INJECTION, SOLUTION PERINEURAL PRN
Status: DISCONTINUED | OUTPATIENT
Start: 2021-01-30 | End: 2021-01-30 | Stop reason: HOSPADM

## 2021-01-30 RX ORDER — CEFOXITIN 1 G/1
1 INJECTION, POWDER, FOR SOLUTION INTRAVENOUS ONCE
Status: COMPLETED | OUTPATIENT
Start: 2021-01-30 | End: 2021-01-30

## 2021-01-30 RX ORDER — CEFOXITIN 2 G/1
2 INJECTION, POWDER, FOR SOLUTION INTRAVENOUS
Status: CANCELLED | OUTPATIENT
Start: 2021-01-30

## 2021-01-30 RX ORDER — LABETALOL HYDROCHLORIDE 5 MG/ML
10 INJECTION, SOLUTION INTRAVENOUS EVERY 5 MIN PRN
Status: DISCONTINUED | OUTPATIENT
Start: 2021-01-30 | End: 2021-01-30 | Stop reason: HOSPADM

## 2021-01-30 RX ADMIN — OXYCODONE HYDROCHLORIDE 5 MG: 5 TABLET ORAL at 20:06

## 2021-01-30 RX ADMIN — CEFOXITIN SODIUM 1 G: 1 POWDER, FOR SOLUTION INTRAVENOUS at 14:58

## 2021-01-30 RX ADMIN — ROCURONIUM BROMIDE 40 MG: 10 INJECTION INTRAVENOUS at 18:09

## 2021-01-30 RX ADMIN — DEXAMETHASONE SODIUM PHOSPHATE 4 MG: 4 INJECTION, SOLUTION INTRA-ARTICULAR; INTRALESIONAL; INTRAMUSCULAR; INTRAVENOUS; SOFT TISSUE at 18:09

## 2021-01-30 RX ADMIN — MIDAZOLAM 2 MG: 1 INJECTION INTRAMUSCULAR; INTRAVENOUS at 18:02

## 2021-01-30 RX ADMIN — SODIUM CHLORIDE, POTASSIUM CHLORIDE, SODIUM LACTATE AND CALCIUM CHLORIDE: 600; 310; 30; 20 INJECTION, SOLUTION INTRAVENOUS at 18:55

## 2021-01-30 RX ADMIN — GLYCOPYRROLATE 0.5 MG: 0.2 INJECTION, SOLUTION INTRAMUSCULAR; INTRAVENOUS at 18:34

## 2021-01-30 RX ADMIN — PROPOFOL 160 MG: 10 INJECTION, EMULSION INTRAVENOUS at 18:09

## 2021-01-30 RX ADMIN — PROPOFOL 45 MCG/KG/MIN: 10 INJECTION, EMULSION INTRAVENOUS at 18:17

## 2021-01-30 RX ADMIN — LIDOCAINE HYDROCHLORIDE 50 MG: 10 INJECTION, SOLUTION INFILTRATION; PERINEURAL at 18:09

## 2021-01-30 RX ADMIN — FENTANYL CITRATE 100 MCG: 50 INJECTION, SOLUTION INTRAMUSCULAR; INTRAVENOUS at 18:09

## 2021-01-30 RX ADMIN — Medication 2.5 MG: at 18:34

## 2021-01-30 RX ADMIN — FENTANYL CITRATE 50 MCG: 50 INJECTION, SOLUTION INTRAMUSCULAR; INTRAVENOUS at 19:22

## 2021-01-30 RX ADMIN — HYDROMORPHONE HYDROCHLORIDE 0.5 MG: 1 INJECTION, SOLUTION INTRAMUSCULAR; INTRAVENOUS; SUBCUTANEOUS at 18:21

## 2021-01-30 RX ADMIN — MEPERIDINE HYDROCHLORIDE 12.5 MG: 25 INJECTION INTRAMUSCULAR; INTRAVENOUS; SUBCUTANEOUS at 19:16

## 2021-01-30 RX ADMIN — GLYCOPYRROLATE 0.2 MG: 0.2 INJECTION, SOLUTION INTRAMUSCULAR; INTRAVENOUS at 18:09

## 2021-01-30 RX ADMIN — ONDANSETRON HYDROCHLORIDE 4 MG: 2 INJECTION, SOLUTION INTRAVENOUS at 18:25

## 2021-01-30 RX ADMIN — SODIUM CHLORIDE, POTASSIUM CHLORIDE, SODIUM LACTATE AND CALCIUM CHLORIDE: 600; 310; 30; 20 INJECTION, SOLUTION INTRAVENOUS at 18:02

## 2021-01-30 ASSESSMENT — ENCOUNTER SYMPTOMS
VOMITING: 0
FEVER: 0
ABDOMINAL PAIN: 1
BLOOD IN STOOL: 1
NAUSEA: 1

## 2021-01-30 NOTE — ED TRIAGE NOTES
Pt presents to ED with c/o abdominal pain that started yesterday, but became much worse early this morning. Pt also c/o nausea and belching. Pt also notes blood in her stool Thursday night. Pt had a telehealth visit and was referred to the ED. ABC intact. A/O X4.

## 2021-01-30 NOTE — ANESTHESIA PREPROCEDURE EVALUATION
Anesthesia Pre-Procedure Evaluation    Patient: Kaley Hart   MRN: [id number] : 1980        Preoperative Diagnosis: Appendicitis [K37]   Procedure : Procedure(s):  APPENDECTOMY, LAPAROSCOPIC     Past Medical History:   Diagnosis Date     Other acne      Unspecified contraceptive management     Nuva ring      Past Surgical History:   Procedure Laterality Date     ENT SURGERY      wisdom teeth removed     NO HISTORY OF SURGERY        Allergies   Allergen Reactions     Codeine Anaphylaxis     Seasonal Allergies Other (See Comments)      Social History     Tobacco Use     Smoking status: Never Smoker     Smokeless tobacco: Never Used   Substance Use Topics     Alcohol use: Yes     Comment: occasionally      Wt Readings from Last 1 Encounters:   20 66.7 kg (147 lb)        Anesthesia Evaluation   Pt has had prior anesthetic. Type: General.    No history of anesthetic complications       ROS/MED HX  ENT/Pulmonary:  - neg pulmonary ROS     Neurologic:  - neg neurologic ROS     Cardiovascular:  - neg cardiovascular ROS     METS/Exercise Tolerance:     Hematologic:  - neg hematologic  ROS     Musculoskeletal:  - neg musculoskeletal ROS     GI/Hepatic:     (+) appendicitis,     Renal/Genitourinary:  - neg Renal ROS     Endo:  - neg endo ROS     Psychiatric/Substance Use:  - neg psychiatric ROS     Infectious Disease:  - neg infectious disease ROS     Malignancy:  - neg malignancy ROS     Other:  - neg other ROS          Physical Exam    Airway        Mallampati: I       Respiratory Devices and Support         Dental  no notable dental history         Cardiovascular   cardiovascular exam normal          Pulmonary   pulmonary exam normal                OUTSIDE LABS:  CBC:   Lab Results   Component Value Date    WBC 7.9 2021    WBC 4.8 2015    HGB 13.7 2021    HGB 14.2 2015    HCT 39.3 2021    HCT 40.4 2015     2021     2015     BMP:   Lab  Results   Component Value Date     01/30/2021    POTASSIUM 3.3 (L) 01/30/2021    CHLORIDE 106 01/30/2021    CO2 28 01/30/2021    BUN 12 01/30/2021    CR 0.82 01/30/2021    GLC 87 01/30/2021    GLC 87 11/06/2020     COAGS: No results found for: PTT, INR, FIBR  POC:   Lab Results   Component Value Date    HCGS Negative 01/30/2021     HEPATIC: No results found for: ALBUMIN, PROTTOTAL, ALT, AST, GGT, ALKPHOS, BILITOTAL, BILIDIRECT, REBECA  OTHER:   Lab Results   Component Value Date    BRICE 8.7 01/30/2021    TSH 0.14 (L) 11/06/2020    T4 1.44 11/06/2020       Anesthesia Plan     History & Physical Review      ASA Status:  1.   Plan for General with Intravenous and Propofol induction. Device: ETT Maintenance will be Balanced.         PONV prophylaxis:  Ondansetron (or other 5HT-3) and Dexamethasone or Solumedrol.       Consents  Anesthesia Plan(s) and associated risks, benefits, and realistic alternatives discussed.    Questions answered and patient/representative(s) expressed understanding.    Discussed with:  Patient.             Postoperative Care  Postoperative pain management: IV analgesics, Oral pain medications and Multi-modal analgesia.           Trung Ohara MD

## 2021-01-30 NOTE — H&P
[facility]  General Surgery Consult    Kaley Hart MRN# [id number]   Age: 40 year old YOB: 1980     HPI:  The patient has been experiencing abdominal pain for the past 1 day. The pain started midline and is currently in the middle of the abdomen from belly button down. The pain associated with anorexia.  Negative for associated fever, chills, nausea and vomiting.   Noted BRBPR last couple days, small amount, on the toilet paper and in bowl, not mixed with stool. History of hemorrhoids, no issues recently, no constipation.     Similar pain in the past:    No  Diarrhea, now or in the past:   No  Colonoscopy:   No    History is obtained from the patient.    Review Of Systems:  The 10 point review of systems is negative other than noted in the HPI.    PMH:  Past Medical History:   Diagnosis Date     Other acne      Unspecified contraceptive management     Nuva ring       PSH:  Past Surgical History:   Procedure Laterality Date     NO HISTORY OF SURGERY         Allergies:  Allergies   Allergen Reactions     Codeine Anaphylaxis     Seasonal Allergies Other (See Comments)       Home Medications:  Current Outpatient Medications   Medication Sig Dispense Refill     calcium carbonate (OS-BRICE) 1500 (600 Ca) MG tablet Take by mouth 2 times daily (with meals)       lactobacillus rhamnosus, GG, (CULTURELL) capsule Take 1 capsule by mouth 2 times daily       levothyroxine (SYNTHROID/LEVOTHROID) 25 MCG tablet Take 1 tablet (25 mcg) by mouth daily 90 tablet 0     multivitamin, therapeutic with minerals (MULTI-VITAMIN) TABS Take 1 tablet by mouth daily       vitamin (B COMPLEX-C) tablet Take 1 tablet by mouth daily       vitamin D2 (ERGOCALCIFEROL) 99808 units (1250 mcg) capsule Take 50,000 Units by mouth once a week         Social History:  Social History     Tobacco Use     Smoking status: Never Smoker     Smokeless tobacco: Never Used   Substance Use Topics     Alcohol use: Yes      Comment: occasionally     Drug use: No       Family History:  Family History   Problem Relation Age of Onset     Other Cancer Mother         ovarian     Skin Cancer Mother      Diabetes Father      No family history chronic diarrhea, inflammatory bowel disease or colon cancer.        Physical Exam:  /88   Pulse 81   Temp 99  F (37.2  C)   Resp 12   LMP 01/13/2021   SpO2 100%   General appearance: Resting Comfortably in bed, no apparent distress  Eyes: conjunctiva clear, pupils equally round and reactive.   Lungs: breathing comfortably on RA  Heart: regular rate  Abdomen: flat   Tenderness: present: suprapubic and RLQ mild, negative rebound tenderness   Masses: none   Organomegaly: none  Extremities: Without clubbing, cyanosis, edema  Neurologic: Grossly intact times four extremities, alert and oriented times three  Psychiatric: Mood and affect are appropriate  Skin: Without lesions or rashes      Labs Reviewed:  Lab Results   Component Value Date    WBC 7.9 01/30/2021     Lab Results   Component Value Date    HGB 13.7 01/30/2021     Lab Results   Component Value Date     01/30/2021     Last Basic Metabolic Panel:  Lab Results   Component Value Date     01/30/2021      Lab Results   Component Value Date    POTASSIUM 3.3 01/30/2021     Lab Results   Component Value Date    CHLORIDE 106 01/30/2021     Lab Results   Component Value Date    BRICE PENDING 01/30/2021     Lab Results   Component Value Date    CO2 PENDING 01/30/2021     Lab Results   Component Value Date    BUN PENDING 01/30/2021     Lab Results   Component Value Date    CR PENDING 01/30/2021     Lab Results   Component Value Date    GLC PENDING 01/30/2021       Radiology:  All imaging studies reviewed by me.    Results for orders placed or performed during the hospital encounter of 01/30/21   CT Abdomen Pelvis without Contrast (stone protocol)    Narrative    CT ABDOMEN AND PELVIS WITHOUT CONTRAST  1/30/2021 1:46 PM    CLINICAL  HISTORY: Right lower quadrant abdominal pain, appendicitis  suspected (Age >= 14y).    TECHNIQUE: CT scan of the abdomen and pelvis was performed without IV  contrast. Multiplanar reformats were obtained. Dose reduction  techniques were used.  CONTRAST: None.    COMPARISON: None.    FINDINGS:   LOWER CHEST: No infiltrates or effusions.    HEPATOBILIARY: No significant mass or bile duct dilatation. No  calcified gallstones.     PANCREAS: No significant mass, duct dilatation, or inflammatory  change.    SPLEEN: Normal size.    ADRENAL GLANDS: No significant nodules.    KIDNEYS/BLADDER: No significant mass, stones, or hydronephrosis.    BOWEL: Appendix measures up to 1 cm. This may indicate appendicitis.    LYMPH NODES: No gross lymphadenopathy in the absence of contrast.    VASCULATURE: No abdominal aortic aneurysm.    PELVIC ORGANS: No pelvic masses.    OTHER: No free air or free fluid.    MUSCULOSKELETAL: No suspicious bony lesions.      Impression    IMPRESSION:  Appendix measures up to 1 cm, this could indicate  appendicitis in the proper clinical context.         ASSESSMENT/PLAN:  The patient's history, physical exam, laboratory and imaging studies are suspicious for acute appendicitis.  I have offered the patient a laparoscopic appendectomy.      We have had a detailed discussion of nature of appendicitis, the procedure, its risks, benefits, alternatives, recovery, postop limitations, anesthesia, bleeding, blood transfusion,  DVT, postoperative infections, injury to adjacent organs and structures, open conversion, bowel resection, prolonged convalescence in the event of gangrene or perforation of the appendix, abdominal wall hernia, intraabdominal adhesions which can lead to bowel obstruction.  We have discussed interventions and treatment for these complications.  The patient understands the possibility of a diagnosis other than appendicitis.  All questions have been answered to the best of my ability.       This case was discussed with ED, Mary Melgoza MD    She elects to proceed.      Pre-operative antibiotics have been given.       Erum Merlos MD    Time spent with the patient, reviewing the EMR, reviewing laboratory and imaging studies, more than 50% of which was counseling and coordinating care:  60 minutes.

## 2021-01-30 NOTE — ED PROVIDER NOTES
History   Chief Complaint:  Abdominal pain      HPI   Kaley Hart is a 40 year old female with history of hypothyroidism who presents with diffuse abdominal pain. This morning, she woke up to diffuse abdominal pain. She initially attributed the pain to muscle strain and attempted to stretch, however, this exacerbated the pain. Since then, the pain has progressively worsened and she also noted increased pain during the car ride to the ED. She also notes that she has had associated nausea and decreased appetite, although she was able to eat 1 piece of [place] toast this morning. Eating did not affect her symptoms. She states that she has also had 2 episodes of hematochezia with well-formed stools; one episode 2 days ago and another this morning. She denies fever and vomiting. She has no history of previous abdominal surgery and she has had no known contact with sick individuals.     Review of Systems   Constitutional: Negative for fever.   Gastrointestinal: Positive for abdominal pain, blood in stool and nausea. Negative for vomiting.   All other systems reviewed and are negative.    Allergies:  Codeine     Medications:  Calcium carbonate  Synthroid  Multivitamin     Past Medical History:    Hypothyroidism    Past Surgical History:    Bladder surgery  [place] teeth extraction      Family History:    Ovarian cancer  Skin cancer  Diabetes  Gout  Thyroid disease    Social History:  Has 3 children     Presents to the ED alone     Physical Exam     Patient Vitals for the past 24 hrs:   BP Temp Pulse Resp SpO2   01/30/21 1430 107/80 -- 74 -- 100 %   01/30/21 1415 109/81 -- 72 -- 100 %   01/30/21 1330 110/77 -- 73 -- 100 %   01/30/21 1316 123/88 -- -- 12 100 %   01/30/21 1254 122/74 99  F (37.2  C) 81 16 99 %     Physical Exam  Constitutional:       Comments: Pleasant and cooperative   HENT:      Right Ear: Tympanic membrane normal.      Left Ear: Tympanic membrane normal.      Mouth/Throat:       Pharynx: No posterior oropharyngeal erythema.   Eyes:      Conjunctiva/sclera: Conjunctivae normal.   Neck:      Musculoskeletal: Neck supple.   Cardiovascular:      Rate and Rhythm: Normal rate and regular rhythm.      Heart sounds: Normal heart sounds.   Pulmonary:      Effort: Pulmonary effort is normal.      Breath sounds: Normal breath sounds.   Abdominal:      General: Bowel sounds are decreased. There is no distension.      Tenderness: There is abdominal tenderness in the right lower quadrant.   Genitourinary:     Comments: Some small, nonthrombosed external hemorrhoids.  No definite bleeding site.  Musculoskeletal: Normal range of motion.   Skin:     General: Skin is warm and dry.   Neurological:      Mental Status: She is alert.       Emergency Department Course   Imaging:  CT Abdomen Pelvis wo contrast Stone protocol   IMPRESSION:  Appendix measures up to 1 cm, this could indicate   appendicitis in the proper clinical context  Reading per radiology    Laboratory:  CBC: WNL. (WBC 7.9, HGB 13.7, )   BMP: Glucose 87, potassium 3.3 (L), o/w WNL (Creatinine: 0.82)    UA with micro: pH 7.5 (H), trace leukocyte esterase, squamous epithelial 5 (H), moderate amorphous crystals o/w negative  HCG Qualitative Urine: negative     Asymptomatic SARS-CoV-2 COVID-19 Virus by PCR: negative     Emergency Department Course:  Reviewed:  I reviewed nursing notes, vitals, past medical history and care everywhere    Assessments:  1304 I obtained history and examined the patient as noted above.   1421 I rechecked the patient and explained findings.   1433    I rechecked the patient and updated her.   1603    I rechecked the patient. I addressed her concerned regarding the blood in her stool.      Consults:   1430: I spoke to Dr. Merlos of general surgery regarding this patient.     Interventions:  1458 Mefoxin, 1 g, IV    Disposition:  Patient sent to the OR     Impression & Plan   Medical Decision Making:  Kaley Todd  Tanner is a 40 year old female who presents to the emergency department with right lower quadrant abdominal pain and tenderness as well as anorexia today.  Exam was suggestive of appendicitis.  CT shows enlarged appendix consistent with that diagnosis.  General surgery will plan to take the patient to the OR for appendectomy as soon as she is appropriately n.p.o.    I believe the patient's bright red blood per rectum associated with bowel movements is unrelated to her presentation here today.  She is hemodynamically stable and history suggests a distal source of bleeding.  I spoke with Dr. Merlos of general surgery about this and have given the patient information for colon and rectal to schedule follow-up as an outpatient.    Covid-19  Kaley Hart was evaluated during a global COVID-19 pandemic, which necessitated consideration that the patient might be at risk for infection with the SARS-CoV-2 virus that causes COVID-19.   Applicable protocols for evaluation were followed during the patient's care. COVID-19 was considered as part of the patient's evaluation. The plan for testing is: a test was obtained during this visit.     Diagnosis:    ICD-10-CM    1. Acute appendicitis with localized peritonitis, without perforation, abscess, or gangrene  K35.30 Case Request: APPENDECTOMY, LAPAROSCOPIC     Case Request: APPENDECTOMY, LAPAROSCOPIC     Scribe Disclosure:  I, Evelia Herrera, am serving as a scribe at 12:57 PM on 1/30/2021 to document services personally performed by Mary Melgoza MD based on my observations and the provider's statements to me.              Mary Melgoza MD  01/30/21 [id number]

## 2021-01-31 NOTE — ANESTHESIA POSTPROCEDURE EVALUATION
Patient: Kaley Hart    Procedure(s):  APPENDECTOMY, LAPAROSCOPIC    Diagnosis:Appendicitis [K37]  Diagnosis Additional Information: No value filed.    Anesthesia Type:  General    Note:     Postop Pain Control: Uneventful            Sign Out: Well controlled pain   PONV: No   Neuro/Psych: Uneventful            Sign Out: Acceptable/Baseline neuro status   Airway/Respiratory: Uneventful            Sign Out: Acceptable/Baseline resp. status   CV/Hemodynamics: Uneventful            Sign Out: Acceptable CV status   Other NRE: NONE   DID A NON-ROUTINE EVENT OCCUR? No         Last vitals:  Vitals:    01/30/21 1722 01/30/21 1854 01/30/21 1900   BP: 101/69 127/74 118/68   Pulse:  104 81   Resp: 14 20 14   Temp: 98.2  F (36.8  C) 98  F (36.7  C)    SpO2: 100% 100% 99%       Electronically Signed By: Kristian Topete MD  January 30, 2021  7:16 PM

## 2021-01-31 NOTE — DISCHARGE INSTRUCTIONS
HOME CARE FOLLOWING APPENDECTOMY  HEIDI Martins, CLINT Alcaraz R. O Donnell, J. Shaheen    INCISIONAL CARE:  Replace the bandage over your incision(s) until all drainage stops, or if more comfortable to have in place.  If present, leave the steri-strips (white paper tapes) in place for 14 days after surgery.  If Dermabond (a type of skin glue) is present, leave in place until it wears/flakes off (2-3 weeks).     BATHING:  OK to shower 24 hours after surgery.  Avoid baths for 1 week after surgery.  You may wash your hair at any time.  Gently pat your incision dry after bathing.  Do not apply lotions, creams, or ointments to incisions.    ACTIVITY:  Light Activity -- you may immediately be up and about as tolerated.  Walking is encouraged, increase as tolerated.  Driving/Light Work-- when comfortable and off narcotic pain medications.  Strenuous Work/Activity -- limit lifting to 20 pounds for 2 weeks.  Progressively increase with time.  Active Sports (running, biking, etc.) -- cautiously resume after 2 weeks.    DISCOMFORT:  For the first 72 hours after surgery, take the following (can be obtained over the counter)  - Ibuprofen (motrin) 400-600mg every 6 hours (max 2,400mg per day)   - Tylenol (acetaminophen) 500mg every 6 hours (max 3,000mg per day)  - Take with food if GI upset occurs  - If additional pain medication is needed, take the narcotic that you were prescribed.  - After 72 hours, take the above pain medication only as needed.  Local anesthetic placed at surgery should provide relief for 4-8 hours.  Begin taking pain pills before discomfort is severe.  Take the pain medication with some food, when possible, to minimize side effects.  Intermittent use of ice packs may help during the first 1-3 weeks after surgery.  Expect gradual improvement.    Over-the-counter anti-inflammatory medications (i.e. Ibuprofen/Advil/Motrin or Naprosyn/Aleve) may be used per package instructions in  "addition to or while tapering off the narcotic pain medications to decrease swelling and sensitivity.  DO NOT TAKE these Anti-inflammatory medications if your primary physician has advised against doing so, or if you have acid reflux, ulcer, or bleeding disorder, or take blood-thinner medications.  Call your primary physician or the surgery office if you have medication questions.  You may have decreased energy level for 1-2 weeks after surgery related to your recovery.    DIET:  Start with liquids and gradually resume your regular diet as tolerated.  Consider eating yogurt or taking a probiotic to help your \"gut orion\" (good bacteria in the bowel/colon) return to normal while taking or after receiving antibiotics.  Drink plenty of fluids.  While taking pain medications, consider use of a stool softener, increase your fiber in your diet, or add a fiber supplement (like Metamucil, Citrucel) to help prevent constipation - a possible side effect of pain medications.    NAUSEA:  If nauseated from the anesthetic/pain meds; rest in bed, get up cautiously with assistance, and drink clear liquids (juice, tea, broth).    FOLLOW-UP AFTER SURGERY:  -Our office will contact you approximately 2-3 weeks after surgery to check on your progress and answer any questions you may have.  If you are doing well, you will not need to return for an office appointment.  If any concerns are identified over the phone, we will help you make an appointment to see a provider.    -If you have not received a phone call, have any questions or concerns, or would like to be seen, please call us at [phone].  We are located at: [address], [address]; [place] [address]    -CONTACT US IF THE FOLLOWING DEVELOPS:   1. A fever that is above 101     2. Increased redness, warmth, drainage, bleeding, or swelling.   3. Pain that is not relieved by rest/ice and your prescription.   4.  Increasing pain after 48 hours.   5. Drainage that is thick, " cloudy, yellow, green or white.   6. Any other questions or concerns.      FREQUENTLY ASKED QUESTIONS:    Q:  How should my incision look?    A:  Normally your incision will appear slightly swollen with light redness directly along the incision itself as it heals.  It may feel like a bump or ridge as the healing/scarring happens, and over time (3-4 months) this bump or ridge feeling should slowly go away.  In general, clear or pink watery drainage can be normal at first as your incision heals, but should decrease over time.    Q:  How do I know if my incision is infected?  A:  Look at your incision for signs of infection, like redness around the incision spreading to surrounding skin, or drainage of cloudy or foul-smelling drainage.  If you feel warm, check your temperature to see if you are running a fever.    **If any of these things occur, please notify the nurse at our office.  We may need you to come into the office for an incision check.      Q:  How do I take care of my incision?  A:  If you have a dressing in place - Starting the day after surgery, replace the dressing 1-2 times a day until there is no further drainage from the incision.  At that time, a dressing is no longer needed.  Try to minimize tape on the skin if irritation is occurring at the tape sites.  If you have significant irritation from tape on the skin, please call the office to discuss other method of dressing your incision.    Small pieces of tape called  steri-strips  may be present directly overlying your incision; these may be removed 10 days after surgery unless otherwise specified by your surgeon.  If these tapes start to loosen at the ends, you may trim them back until they fall off or are removed.    A:  If you had  Dermabond  tissue glue used as a dressing (this causes your incision to look shiny with a clear covering over it) - This type of dressing wears off with time and does not require more dressings over the top unless it is  draining around the glue as it wears off.  Do not apply ointments or lotions over the incisions until the glue has completely worn off.    Q:  There is a piece of tape or a sticky  lead  still on my skin.  Can I remove this?  A:  Sometimes the sticky  leads  used for monitoring during surgery or for evaluation in the emergency department are not all removed while you are in the hospital.  These sometimes have a tab or metal dot on them.  You can easily remove these on your own, like taking off a band-aid.  If there is a gel substance under the  lead , simply wipe/clean it off with a washcloth or paper towel.      Q:  What can I do to minimize constipation (very hard stools, or lack of stools)?  A:  Stay well hydrated.  Increase your dietary fiber intake or take a fiber supplement -with plenty of water.  Walk around frequently.  You may consider an over-the-counter stool-softener.  Your Pharmacist can assist you with choosing one that is stocked at your pharmacy.  Constipation is also one of the most common side effects of pain medication.  If you are using pain medication, be pro-active and try to PREVENT problems with constipation by taking the steps above BEFORE constipation becomes a problem.    Q:  What do I do if I need more pain medications?  A:  Call the office to receive refills.  Be aware that certain pain meds cannot be called into a pharmacy and actually require a paper prescription.  A change may be made in your pain med as you progress thru your recovery period or if you have side effects to certain meds.    --Pain meds are NOT refilled after 5pm on weekdays, and NOT AT ALL on the weekends, so please look ahead to prevent problems.    Q:  Why am I having a hard time sleeping now that I am at home?  A:  Many medications you receive while you are in the hospital can impact your sleep for a number of days after your surgery/hospitalization.  Decreased level of activity and naps during the day may also make  sleeping at night difficult.  Try to minimize day-time naps, and get up frequently during the day to walk around your home during your recovery time.  Sleep aides may be of some help, but are not recommended for long-term use.      Q:  I am having some back discomfort.  What should I do?  A:  This may be related to certain positioning that was required for your surgery, extended periods of time in bed, or other changes in your overall activity level.  You may try ice, heat, acetaminophen, or ibuprofen to treat this temporarily.  Note that many pain medications have acetaminophen in them and would state this on the prescription bottle.  Be sure not to exceed the maximum of 4000mg per day of acetaminophen.     **If the pain you are having does not resolve, is severe, or is a flare of back pain you have had on other occasions prior to surgery, please contact your primary physician for further recommendations or for an appointment to be examined at their office.    Q:  Why am I having headaches?  A:  Headaches can be caused by many things:  caffeine withdrawal, use of pain meds, dehydration, high blood pressure, lack of sleep, over-activity/exhaustion, flare-up of usual migraine headaches.  If you feel this is related to muscle tension (a band-like feeling around the head, or a pressure at the low-back of the head) you may try ice or heat to this area.  You may need to drink more fluids (try electrolyte drink like Gatorade), rest, or take your usual migraine medications.   **If your headaches do not resolve, worsen, are accompanied by other symptoms, or if your blood pressure is high, please call your primary physician for recommendation and/or examination.    Q:  I am unable to urinate.  What do I do?  A:  A small percentage of people can have difficulty urinating initially after surgery.  This includes being able to urinate only a very small amount at a time and feeling discomfort or pressure in the very low abdomen.   This is called  urinary retention , and is actually an urgent situation.  Proceed to your nearest Emergency department for evaluation (not an Urgent Care Center).  Sometimes the bladder does not work correctly after certain medications you receive during surgery, or related to certain procedures.  You may need to have a catheter placed until your bladder recovers.  When planning to go to an Emergency department, it may help to call the ER to let them know you are coming in for this problem after a surgery.  This may help you get in quicker to be evaluated.  **If you have symptoms of a urinary tract infection, please contact your primary physician for the proper evaluation and treatment.        If you have other questions, please call the office Monday thru Friday between 8am and 5pm to discuss with the Nurse or Physician Assistant.  #[phone]    There is a surgeon ON CALL on weekday evenings and over the weekend in case of urgent need only, and may be contacted at the same number.    If you are having an emergency, call 911 or proceed to your nearest emergency department.        GENERAL ANESTHESIA OR SEDATION ADULT DISCHARGE INSTRUCTIONS   SPECIAL PRECAUTIONS FOR 24 HOURS AFTER SURGERY    IT IS NOT UNUSUAL TO FEEL LIGHT-HEADED OR FAINT, UP TO 24 HOURS AFTER SURGERY OR WHILE TAKING PAIN MEDICATION.  IF YOU HAVE THESE SYMPTOMS; SIT FOR A FEW MINUTES BEFORE STANDING AND HAVE SOMEONE ASSIST YOU WHEN YOU GET UP TO WALK OR USE THE BATHROOM.    YOU SHOULD REST AND RELAX FOR THE NEXT 24 HOURS AND YOU MUST MAKE ARRANGEMENTS TO HAVE SOMEONE STAY WITH YOU FOR AT LEAST 24 HOURS AFTER YOUR DISCHARGE.  AVOID HAZARDOUS AND STRENUOUS ACTIVITIES.  DO NOT MAKE IMPORTANT DECISIONS FOR 24 HOURS.    DO NOT DRIVE ANY VEHICLE OR OPERATE MECHANICAL EQUIPMENT FOR 24 HOURS FOLLOWING THE END OF YOUR SURGERY.  EVEN THOUGH YOU MAY FEEL NORMAL, YOUR REACTIONS MAY BE AFFECTED BY THE MEDICATION YOU HAVE RECEIVED.    DO NOT DRINK ALCOHOLIC  BEVERAGES FOR 24 HOURS FOLLOWING YOUR SURGERY.    DRINK CLEAR LIQUIDS (APPLE JUICE, GINGER ALE, 7-UP, BROTH, ETC.).  PROGRESS TO YOUR REGULAR DIET AS YOU FEEL ABLE.    YOU MAY HAVE A DRY MOUTH, A SORE THROAT, MUSCLES ACHES OR TROUBLE SLEEPING.  THESE SHOULD GO AWAY AFTER 24 HOURS.    CALL YOUR DOCTOR FOR ANY OF THE FOLLOWING:  SIGNS OF INFECTION (FEVER, GROWING TENDERNESS AT THE SURGERY SITE, A LARGE AMOUNT OF DRAINAGE OR BLEEDING, SEVERE PAIN, FOUL-SMELLING DRAINAGE, REDNESS OR SWELLING.    IT HAS BEEN OVER 8 TO 10 HOURS SINCE SURGERY AND YOU ARE STILL NOT ABLE TO URINATE (PASS WATER).

## 2021-01-31 NOTE — OP NOTE
General Surgery Operative Note      Pre-operative diagnosis: acute appendicitis   Post-operative diagnosis: same    Procedure: laparoscopic appendectomy   Surgeon: Erum Merlos MD   Assistant(s): NONE   Anesthesia: general    Estimated blood loss:  Complications: 1 cc  none   Specimens: ID Type Source Tests Collected by Time Destination   A : appendix Tissue Appendix SURGICAL PATHOLOGY EXAM Erum Merlos MD 1/30/2021  6:31 PM         INDICATION FOR PROCEDURE: This is a 40 year old healthy female who presented with abdominal pain of 1 days duration. CT scan of the abdomen was consistent with acute appendicitis without evidence for abscess or perforation. We discussed operative management of appendicitis including risks and benefits, and the patient agreed to proceed.    DESCRIPTION OF PROCEDURE:  The patient was placed on the table in supine position.  General endotracheal anesthesia was induced and the abdomen was prepped and draped in standard sterile fashion.  A 5mm visiport trocar was placed in the left upper quadrant. Pneumoperitoneum was established and the abdomen was surveyed. A 5 mm trocar was placed in the suprapubic region, and a 12mm trocar was placed  in supraumbilical position under direct visualization.  The patient was placed in Trendelenburg and right side up.  The appendix was immediately identified in the right lower quadrant.  It appeared mildly dilated and was lying anterior to the cecum and small bowel. Lateral attachments were taken down with cautery. A window was created between the appendix base and the mesoappendix using a [place] dissector.  The base of the appendix was ligated and divided with the Endo-XAVI stapler.  The mesoappendix was ligated and divided with a reload of the Endo-XAVI stapler.  The appendix was passed into an Endocatch bag.  The right lower quadrant was inspected for hemostasis.  Hemostasis was assured.  The two 5mm trocars were removed under direct visualization  to ensure no bleeding from port sites. The 12mm port site was removed and the endocatch bag was removed from the abdomen and the pneumoperitoneum evacuated. The 12mm port site fascia was closed with 0 vicryl on UR6.  The skin of all 3 incisions was anesthetized with local anesthetic and closed with interrupted 4-0 Vicryl subcuticular sutures and Steri-Strips.  The patient tolerated the procedure well.  Sponge and instrument counts were correct.    FINDINGS: dilated appendix consistent with acute appendicitis.     Erum Merlos MD

## 2021-01-31 NOTE — ANESTHESIA CARE TRANSFER NOTE
Patient: Kaley Hart    Procedure(s):  APPENDECTOMY, LAPAROSCOPIC    Diagnosis: Appendicitis [K37]  Diagnosis Additional Information: No value filed.    Anesthesia Type:   General     Note:    Oropharynx: spontaneously breathing  Level of Consciousness: awake  Oxygen Supplementation: face mask    Independent Airway: airway patency satisfactory and stable  Dentition: dentition unchanged  Vital Signs Stable: post-procedure vital signs reviewed and stable    Patient transferred to: PACU  Comments: To PACU, oxygen per face mask. Report to RN.        Vitals: (Last set prior to Anesthesia Care Transfer)  CRNA VITALS  1/30/2021 1820 - 1/30/2021 1856      1/30/2021             Pulse:  118    SpO2:  100 %        Electronically Signed By: LUIS ANGEL Hernandez CRNA  January 30, 2021  6:56 PM

## 2021-02-02 LAB — COPATH REPORT: NORMAL

## 2021-02-05 DIAGNOSIS — E06.3 HYPOTHYROIDISM DUE TO HASHIMOTO'S THYROIDITIS: ICD-10-CM

## 2021-02-06 RX ORDER — LEVOTHYROXINE SODIUM 25 UG/1
25 TABLET ORAL DAILY
Qty: 30 TABLET | Refills: 0 | Status: SHIPPED | OUTPATIENT
Start: 2021-02-06 | End: 2021-02-17

## 2021-02-06 NOTE — TELEPHONE ENCOUNTER
We had just lowered her dose. She was supposed to follow up for lab recheck (this was already ordered). I will send in 30 days for now. She should schedule this lab check in the next 30 days. Thanks.

## 2021-02-06 NOTE — TELEPHONE ENCOUNTER
Failed protocol.  please route to  team if patient needs an appointment     Esther FOSTERRN BSN  [facility]  [phone]

## 2021-02-16 ENCOUNTER — TELEPHONE (OUTPATIENT)
Dept: SURGERY | Facility: CLINIC | Age: 41
End: 2021-02-16

## 2021-02-16 DIAGNOSIS — E06.3 HYPOTHYROIDISM DUE TO HASHIMOTO'S THYROIDITIS: ICD-10-CM

## 2021-02-16 PROCEDURE — 36415 COLL VENOUS BLD VENIPUNCTURE: CPT | Performed by: NURSE PRACTITIONER

## 2021-02-16 PROCEDURE — 84443 ASSAY THYROID STIM HORMONE: CPT | Performed by: NURSE PRACTITIONER

## 2021-02-17 DIAGNOSIS — E06.3 HYPOTHYROIDISM DUE TO HASHIMOTO'S THYROIDITIS: ICD-10-CM

## 2021-02-17 LAB — TSH SERPL DL<=0.005 MIU/L-ACNC: 2.98 MU/L (ref 0.4–4)

## 2021-02-17 RX ORDER — LEVOTHYROXINE SODIUM 25 UG/1
25 TABLET ORAL DAILY
Qty: 90 TABLET | Refills: 3 | Status: SHIPPED | OUTPATIENT
Start: 2021-02-17 | End: 2022-02-25

## 2021-03-10 ENCOUNTER — TELEPHONE (OUTPATIENT)
Dept: SURGERY | Facility: CLINIC | Age: 41
End: 2021-03-10

## 2021-03-10 ENCOUNTER — HOSPITAL ENCOUNTER (OUTPATIENT)
Dept: CT IMAGING | Facility: CLINIC | Age: 41
Discharge: HOME | End: 2021-03-10
Attending: SURGERY | Admitting: SURGERY
Payer: COMMERCIAL

## 2021-03-10 ENCOUNTER — OFFICE VISIT (OUTPATIENT)
Dept: SURGERY | Facility: CLINIC | Age: 41
End: 2021-03-10
Payer: COMMERCIAL

## 2021-03-10 VITALS
OXYGEN SATURATION: 100 % | SYSTOLIC BLOOD PRESSURE: 102 MMHG | WEIGHT: 146 LBS | DIASTOLIC BLOOD PRESSURE: 68 MMHG | HEART RATE: 76 BPM | BODY MASS INDEX: 21.62 KG/M2 | HEIGHT: 69 IN | RESPIRATION RATE: 16 BRPM

## 2021-03-10 DIAGNOSIS — R19.09 UMBILICAL MASS: ICD-10-CM

## 2021-03-10 DIAGNOSIS — Z09 SURGICAL FOLLOWUP VISIT: Primary | ICD-10-CM

## 2021-03-10 PROCEDURE — 250N000011 HC RX IP 250 OP 636: Performed by: SURGERY

## 2021-03-10 PROCEDURE — 99024 POSTOP FOLLOW-UP VISIT: CPT | Performed by: SURGERY

## 2021-03-10 PROCEDURE — 250N000009 HC RX 250: Performed by: SURGERY

## 2021-03-10 PROCEDURE — 74177 CT ABD & PELVIS W/CONTRAST: CPT

## 2021-03-10 PROCEDURE — 999N000128 HC STATISTIC PERIPHERAL IV START W/O US GUIDANCE

## 2021-03-10 RX ORDER — IOPAMIDOL 755 MG/ML
71 INJECTION, SOLUTION INTRAVASCULAR ONCE
Status: COMPLETED | OUTPATIENT
Start: 2021-03-10 | End: 2021-03-10

## 2021-03-10 RX ADMIN — IOPAMIDOL 71 ML: 755 INJECTION, SOLUTION INTRAVENOUS at 16:27

## 2021-03-10 RX ADMIN — SODIUM CHLORIDE 60 ML: 9 INJECTION, SOLUTION INTRAVENOUS at 16:28

## 2021-03-10 ASSESSMENT — MIFFLIN-ST. JEOR: SCORE: 1393.69

## 2021-03-10 NOTE — PROGRESS NOTES
[facility] Follow Up    Subjective:  Kaley is here for a postoperative visit. She underwent laparoscopic appendectomy on 1/30/21. Did well initially postop, at her 2 week telephone follow up no reported problems.   She noticed a bump just above her umbilical incision yesterday that is tender. Doesn't change in size. Prior to yesterday it was soft and she hadn't noticed it.    Objective:  Abd - soft, non-tender, non-distended  Inc(s) - c/d/i, healing well, no erythema. Very small firm area just superior to supraumbilical incision, tender, doesn't change in size with pressure    Plan:  CT abd/pelv to look for incisional hernia  Discussed repair if present, will call pt with CT results when available.    Erum Merlos MD

## 2021-03-10 NOTE — PATIENT INSTRUCTIONS
CT ABDOMEN AND PELVIS WITH CONTRAST     Date: 3-10-21  Time: 4:30 pm   Location: [place] [address] [address]  [id number]        Please check in at 4:00 pm

## 2021-03-10 NOTE — TELEPHONE ENCOUNTER
S/p laparoscopic appendectomy  Date: 1/30/21  Surgeon: Dr. Merlos     Patient is calling to report that she had been doing well after surgery.  Feels good, incisions appeared to be healing well.  A few days ago she noted a sudden lump the size of a nickel at umbilical site.  Lump is firm and tender when she presses on it.  She has not tried to push lump back in.     She denies any unusual activity or heavy lifting.  No redness or drainage from incision.  Denies N/V, she is having normal BM's.    Other incisions without redness, swelling or drainage.      She is concerned about the lump and would like to have it looked at it as soon as possible.      Scheduled for post-op appointment with Dr. Merlos this morning.    Pt agrees with plan.

## 2021-04-06 ENCOUNTER — IMMUNIZATION (OUTPATIENT)
Dept: NURSING | Facility: CLINIC | Age: 41
End: 2021-04-06
Payer: COMMERCIAL

## 2021-04-06 PROCEDURE — 91300 PR COVID VAC PFIZER DIL RECON 30 MCG/0.3 ML IM: CPT

## 2021-04-06 PROCEDURE — 0001A PR COVID VAC PFIZER DIL RECON 30 MCG/0.3 ML IM: CPT

## 2021-04-27 ENCOUNTER — IMMUNIZATION (OUTPATIENT)
Dept: NURSING | Facility: CLINIC | Age: 41
End: 2021-04-27
Attending: INTERNAL MEDICINE
Payer: COMMERCIAL

## 2021-04-27 PROCEDURE — 0002A PR COVID VAC PFIZER DIL RECON 30 MCG/0.3 ML IM: CPT

## 2021-04-27 PROCEDURE — 91300 PR COVID VAC PFIZER DIL RECON 30 MCG/0.3 ML IM: CPT

## 2021-07-13 ENCOUNTER — TRANSFERRED RECORDS (OUTPATIENT)
Dept: HEALTH INFORMATION MANAGEMENT | Facility: CLINIC | Age: 41
End: 2021-07-13

## 2021-08-09 ENCOUNTER — VIRTUAL VISIT (OUTPATIENT)
Dept: FAMILY MEDICINE | Facility: CLINIC | Age: 41
End: 2021-08-09
Payer: COMMERCIAL

## 2021-08-09 DIAGNOSIS — R53.83 FATIGUE, UNSPECIFIED TYPE: ICD-10-CM

## 2021-08-09 DIAGNOSIS — R05.9 COUGH: ICD-10-CM

## 2021-08-09 DIAGNOSIS — R09.81 CONGESTION OF PARANASAL SINUS: ICD-10-CM

## 2021-08-09 DIAGNOSIS — J01.90 ACUTE SINUSITIS WITH SYMPTOMS > 10 DAYS: Primary | ICD-10-CM

## 2021-08-09 DIAGNOSIS — R50.9 FEVER, UNSPECIFIED FEVER CAUSE: ICD-10-CM

## 2021-08-09 DIAGNOSIS — H92.03 OTALGIA OF BOTH EARS: ICD-10-CM

## 2021-08-09 PROCEDURE — 99213 OFFICE O/P EST LOW 20 MIN: CPT | Mod: 95 | Performed by: NURSE PRACTITIONER

## 2021-08-09 NOTE — PROGRESS NOTES
Kaley is a 40 year old who is being evaluated via a billable video visit.      How would you like to obtain your AVS? MyChart  If the video visit is dropped, the invitation should be resent by: Text to cell phone: [phone]  Will anyone else be joining your video visit? No    Video Start Time: 5:42 PM    Assessment & Plan     Acute sinusitis with symptoms > 10 days  Comment: No red flags to prompt suspicion for pneumonia or other potentially-invasive respiratory process at this point. Negative COVID test yesterday. Symptom profile and trajectory suggest ABRS. Will treat with Augmentin for 7 days. Symptomatic cares (see patient instructions) and follow up precautions discussed in detail. Discussed risks, benefits, alternatives, potential side effects, and proper administration of new medication / treatment. Agrees with plan of care. All questions answered.   - amoxicillin-clavulanate (AUGMENTIN) 875-125 MG tablet  Dispense: 14 tablet; Refill: 0    Fever, unspecified fever cause    Congestion of paranasal sinus    Otalgia of both ears    Cough    Fatigue, unspecified type       See Patient Instructions    Return in about 1 week (around 8/16/2021) for persistent or worsening symptoms.    Merritt Murphy NP  [facility]    Juwan Roche is a 40 year old who presents for the following health issues     HPI     Acute Illness  Acute illness concerns: cough cold/flu like symptoms  Onset/Duration: 3 weeks  Symptoms:  Fever: YES  Chills/Sweats: no  Headache (location?): no  Sinus Pressure: YES  Conjunctivitis:  YES  Ear Pain: YES  Rhinorrhea: no  Congestion: YES  Sore Throat: no  Cough: YES  Wheeze: no  Decreased Appetite: YES  Nausea: no  Vomiting: no  Diarrhea: no  Dysuria/Freq.: no  Dysuria or Hematuria: no  Fatigue/Achiness: YES  Sick/Strep Exposure: YES - daughter and  (2-3 days each).   Therapies tried and outcome: OTC medications mucinex, day/nyquil    HPI: Kaley presents  today with the complaints of fever, fatigue, sinus congestion / pressure / pain, ear pain, cough.     Denies SOB or wheeze.     Had COVID vaccine series in April. Negative COVID test yesterday.     Review of Systems   Constitutional, HEENT, pulmonary systems are negative, except as otherwise noted.      Objective           Vitals:  No vitals were obtained today due to virtual visit.    Physical Exam   GENERAL: Healthy, alert and no distress  EYES: Eyes grossly normal to inspection.  No discharge or erythema, or obvious scleral/conjunctival abnormalities.  RESP: No audible wheeze, cough, or visible cyanosis.  No visible retractions or increased work of breathing.    SKIN: Visible skin clear. No significant rash, abnormal pigmentation or lesions.  NEURO: Cranial nerves grossly intact.  Mentation and speech appropriate for age.  PSYCH: Mentation appears normal, affect normal/bright, judgement and insight intact, normal speech and appearance well-groomed.            Video-Visit Details    Type of service:  Video Visit    Video End Time:5:52 PM    Originating Location (pt. Location): Home    Distant Location (provider location):  [facility]     Platform used for Video Visit: [employer]

## 2021-09-25 ENCOUNTER — HEALTH MAINTENANCE LETTER (OUTPATIENT)
Age: 41
End: 2021-09-25

## 2022-01-05 ENCOUNTER — TRANSFERRED RECORDS (OUTPATIENT)
Dept: HEALTH INFORMATION MANAGEMENT | Facility: CLINIC | Age: 42
End: 2022-01-05
Payer: COMMERCIAL

## 2022-01-05 LAB
PHQ9 SCORE: 3
TSH SERPL-ACNC: 2.9 ULU/ML (ref 0.45–4.5)

## 2022-01-07 ENCOUNTER — TRANSFERRED RECORDS (OUTPATIENT)
Dept: HEALTH INFORMATION MANAGEMENT | Facility: CLINIC | Age: 42
End: 2022-01-07
Payer: COMMERCIAL

## 2022-02-23 ENCOUNTER — DOCUMENTATION ONLY (OUTPATIENT)
Dept: LAB | Facility: CLINIC | Age: 42
End: 2022-02-23

## 2022-02-23 NOTE — PROGRESS NOTES
"Patient is scheduled to be seen in our lab TODAY 2/23/2022 AT 10:15.      Appointment notes indicate \"THROID LEVEL, T4 AND TSH \".        NO current FUTURE or STANDING ORDERS have been placed.  Please place current FUTURE or STANDING orders as needed.      PATIENT WANTS A CALL BACK ASAP IF UNABLE TO ORDER, SO SHE DOES NOT WASTE A TRIP    Note that orders have a maximum duration of one (1) year.    Thank you.  Please call if you have any questions.     "

## 2022-02-23 NOTE — PROGRESS NOTES
I have never seen the patient . She has upcoming appointment with me and I am happy to do the needful once I evaluate her - as additional lab work may be considered once I examine her - to avoid multiple lab visits.     Please let the patient know.     Thank you,  Savita Jerome MD on 2/23/2022 at 3:41 PM

## 2022-02-25 ENCOUNTER — OFFICE VISIT (OUTPATIENT)
Dept: FAMILY MEDICINE | Facility: CLINIC | Age: 42
End: 2022-02-25
Payer: COMMERCIAL

## 2022-02-25 VITALS
DIASTOLIC BLOOD PRESSURE: 64 MMHG | SYSTOLIC BLOOD PRESSURE: 110 MMHG | HEART RATE: 59 BPM | OXYGEN SATURATION: 99 % | WEIGHT: 140.8 LBS | BODY MASS INDEX: 21.1 KG/M2 | TEMPERATURE: 97.5 F

## 2022-02-25 DIAGNOSIS — Z11.59 NEED FOR HEPATITIS C SCREENING TEST: ICD-10-CM

## 2022-02-25 DIAGNOSIS — E87.6 HYPOKALEMIA: ICD-10-CM

## 2022-02-25 DIAGNOSIS — Z00.00 ROUTINE GENERAL MEDICAL EXAMINATION AT A HEALTH CARE FACILITY: Primary | ICD-10-CM

## 2022-02-25 DIAGNOSIS — Z13.21 ENCOUNTER FOR VITAMIN DEFICIENCY SCREENING: ICD-10-CM

## 2022-02-25 DIAGNOSIS — Z11.4 SCREENING FOR HIV (HUMAN IMMUNODEFICIENCY VIRUS): ICD-10-CM

## 2022-02-25 DIAGNOSIS — E78.5 DYSLIPIDEMIA: ICD-10-CM

## 2022-02-25 DIAGNOSIS — Z12.31 ENCOUNTER FOR SCREENING MAMMOGRAM FOR BREAST CANCER: ICD-10-CM

## 2022-02-25 DIAGNOSIS — E06.3 HYPOTHYROIDISM DUE TO HASHIMOTO'S THYROIDITIS: ICD-10-CM

## 2022-02-25 DIAGNOSIS — E06.3 HASHIMOTO'S THYROIDITIS: ICD-10-CM

## 2022-02-25 PROBLEM — N39.3 FEMALE STRESS INCONTINENCE: Status: ACTIVE | Noted: 2018-05-11

## 2022-02-25 PROBLEM — B00.1 COLD SORE: Status: ACTIVE | Noted: 2017-11-17

## 2022-02-25 PROCEDURE — 99396 PREV VISIT EST AGE 40-64: CPT | Performed by: INTERNAL MEDICINE

## 2022-02-25 RX ORDER — LEVOTHYROXINE SODIUM 50 UG/1
TABLET ORAL
COMMUNITY
End: 2022-05-23

## 2022-02-25 ASSESSMENT — ENCOUNTER SYMPTOMS
NERVOUS/ANXIOUS: 0
FREQUENCY: 0
HEADACHES: 0
ARTHRALGIAS: 0
FEVER: 0
HEMATOCHEZIA: 0
PARESTHESIAS: 1
DIARRHEA: 0
EYE PAIN: 0
CONSTIPATION: 0
BREAST MASS: 0
DIZZINESS: 0
SORE THROAT: 0
ABDOMINAL PAIN: 0
JOINT SWELLING: 0
WEAKNESS: 0
MYALGIAS: 0
COUGH: 0
PALPITATIONS: 0
CHILLS: 0
NAUSEA: 0
HEMATURIA: 0
HEARTBURN: 0
DYSURIA: 0
SHORTNESS OF BREATH: 0

## 2022-02-25 NOTE — PATIENT INSTRUCTIONS
As discussed, please make LAB only appointment for getting fasting labs.     ======================      Preventive Health Recommendations  Female Ages 40 to 49    Yearly exam:     See your health care provider every year in order to  1. Review health changes.   2. Discuss preventive care.    3. Review your medicines if your doctor prescribed any.      Get a Pap test every three years (unless you have an abnormal result and your provider advises testing more often).      If you get Pap tests with HPV test, you only need to test every 5 years, unless you have an abnormal result. You do not need a Pap test if your uterus was removed (hysterectomy) and you have not had cancer.      You should be tested each year for STDs (sexually transmitted diseases), if you're at risk.     Ask your doctor if you should have a mammogram.      Have a colonoscopy (test for colon cancer) if someone in your family has had colon cancer or polyps before age 50.       Have a cholesterol test every 5 years.       Have a diabetes test (fasting glucose) after age 45. If you are at risk for diabetes, you should have this test every 3 years.    Shots: Get a flu shot each year. Get a tetanus shot every 10 years.     Nutrition:     Eat at least 5 servings of fruits and vegetables each day.    Eat whole-grain bread, whole-wheat pasta and brown rice instead of white grains and rice.    Get adequate Calcium and Vitamin D.      Lifestyle    Exercise at least 150 minutes a week (an average of 30 minutes a day, 5 days a week). This will help you control your weight and prevent disease.    Limit alcohol to one drink per day.    No smoking.     Wear sunscreen to prevent skin cancer.    See your dentist every six months for an exam and cleaning.

## 2022-02-25 NOTE — PROGRESS NOTES
SUBJECTIVE:   CC: Kaley Hart is an 41 year old woman who presents for preventive health visit.     Patient has been advised of split billing requirements and indicates understanding: Yes  Healthy Habits:     Getting at least 3 servings of Calcium per day:  Yes    Bi-annual eye exam:  Yes    Dental care twice a year:  Yes    Sleep apnea or symptoms of sleep apnea:  None    Diet:  Regular (no restrictions) and Breakfast skipped    Frequency of exercise:  4-5 days/week    Duration of exercise:  30-45 minutes    Taking medications regularly:  Yes    Medication side effects:  None    PHQ-2 Total Score: 1    Additional concerns today:  Yes      Today's PHQ-2 Score:   PHQ-2 ( 1999 [employer]) 2/25/2022   Q1: Little interest or pleasure in doing things 0   Q2: Feeling down, depressed or hopeless 1   PHQ-2 Score 1   PHQ-2 Total Score (12-17 Years)- Positive if 3 or more points; Administer PHQ-A if positive -   Q1: Little interest or pleasure in doing things Not at all   Q2: Feeling down, depressed or hopeless Several days   PHQ-2 Score 1       Abuse: Current or Past (Physical, Sexual or Emotional) - No  Do you feel safe in your environment? Yes    Have you ever done Advance Care Planning? (For example, a Health Directive, POLST, or a discussion with a medical provider or your loved ones about your wishes): No, advance care planning information given to patient to review.  Patient plans to discuss their wishes with loved ones or provider.      Social History     Tobacco Use     Smoking status: Never Smoker     Smokeless tobacco: Never Used   Substance Use Topics     Alcohol use: Yes     Comment: occasionally     If you drink alcohol do you typically have >3 drinks per day or >7 drinks per week? No    Alcohol Use 2/25/2022   Prescreen: >3 drinks/day or >7 drinks/week? No   Prescreen: >3 drinks/day or >7 drinks/week? -       Reviewed orders with patient.  Reviewed health maintenance and updated orders accordingly -  Yes  Lab work is in process  Labs reviewed in EPIC    Breast Cancer Screening:    Breast CA Risk Assessment (FHS-7) 2022   Do you have a family history of breast, colon, or ovarian cancer? No / Unknown       click delete button to remove this line now  Mammogram Screening - Offered annual screening and updated Health Maintenance for mutual plan based on risk factor consideration    Pertinent mammograms are reviewed under the imaging tab.    History of abnormal Pap smear: NO - age 30-65 PAP every 5 years with negative HPV co-testing recommended     Reviewed and updated as needed this visit by clinical staff   Tobacco  Allergies  Meds  Problems  Med Hx  Surg Hx  Fam Hx  Soc   Hx        Reviewed and updated as needed this visit by Provider   Tobacco  Allergies  Meds  Problems  Med Hx  Surg Hx  Fam Hx         Past Medical History:   Diagnosis Date     Other acne      Thyroid disease 2017     Unspecified contraceptive management     Nuva ring      Past Surgical History:   Procedure Laterality Date     ABDOMEN SURGERY  2021    Laproscopic Appendectomy     ENT SURGERY      wisdom teeth removed     EYE SURGERY  2006    Lasik     LAPAROSCOPIC APPENDECTOMY N/A 2021    Procedure: APPENDECTOMY, LAPAROSCOPIC;  Surgeon: Erum Merlos MD;  Location: [facility] OR     NO HISTORY OF SURGERY       OB History    Para Term  AB Living   0 0 0 0 0 0   SAB IAB Ectopic Multiple Live Births   0 0 0 0 0       Review of Systems   Constitutional: Negative for chills and fever.   HENT: Negative for congestion, ear pain, hearing loss and sore throat.    Eyes: Negative for pain and visual disturbance.   Respiratory: Negative for cough and shortness of breath.    Cardiovascular: Negative for chest pain, palpitations and peripheral edema.   Gastrointestinal: Negative for abdominal pain, constipation, diarrhea, heartburn, hematochezia and nausea.   Breasts:  Negative for tenderness, breast mass and  discharge.   Genitourinary: Negative for dysuria, frequency, genital sores, hematuria, pelvic pain, urgency, vaginal bleeding and vaginal discharge.   Musculoskeletal: Negative for arthralgias, joint swelling and myalgias.   Skin: Negative for rash.   Neurological: Positive for paresthesias. Negative for dizziness, weakness and headaches.   Psychiatric/Behavioral: Negative for mood changes. The patient is not nervous/anxious.      CONSTITUTIONAL: NEGATIVE for fever, chills, change in weight  INTEGUMENTARU/SKIN: NEGATIVE for worrisome rashes, moles or lesions  EYES: NEGATIVE for vision changes or irritation  ENT: NEGATIVE for ear, mouth and throat problems  RESP: NEGATIVE for significant cough or SOB  BREAST: NEGATIVE for masses, tenderness or discharge  CV: NEGATIVE for chest pain, palpitations or peripheral edema  GI: NEGATIVE for nausea, abdominal pain, heartburn, or change in bowel habits  : NEGATIVE for unusual urinary or vaginal symptoms. Periods are regular.  MUSCULOSKELETAL: NEGATIVE for significant arthralgias or myalgia  NEURO: NEGATIVE for weakness, dizziness or paresthesias  PSYCHIATRIC: NEGATIVE for changes in mood or affect     OBJECTIVE:   /64 (Cuff Size: Adult Regular)   Pulse 59   Temp 97.5  F (36.4  C) (Tympanic)   Wt 63.9 kg (140 lb 12.8 oz)   SpO2 99%   BMI 21.10 kg/m    Physical Exam  GENERAL: healthy, alert and no distress  EYES: Eyes grossly normal to inspection, PERRL and conjunctivae and sclerae normal  HENT: ear canals and TM's normal, nose and mouth without ulcers or lesions  NECK: no adenopathy, no asymmetry, masses, or scars and thyroid normal to palpation  RESP: lungs clear to auscultation - no rales, rhonchi or wheezes  BREAST: normal without masses, tenderness or nipple discharge and no palpable axillary masses or adenopathy  CV: regular rate and rhythm, normal S1 S2, no S3 or S4, no murmur, click or rub, no peripheral edema and peripheral pulses strong  ABDOMEN: soft,  nontender, no hepatosplenomegaly, no masses and bowel sounds normal  MS: no gross musculoskeletal defects noted, no edema  SKIN: no suspicious lesions or rashes  NEURO: Normal strength and tone, mentation intact and speech normal  PSYCH: mentation appears normal, affect normal/bright    Diagnostic Test Results:  Labs reviewed in Epic    ASSESSMENT/PLAN:     Assessment and Plan  1. Routine general medical examination at a health care facility  Pt is new to me, previously followed Jeffrey ROMEO as PCP. She is here to establish care.  Recent TSH check at external lab in 1/5/2022 shows TSH 2.9. Last CMP & CBC in 1/2021 with hypokalmia, Dyslipidemia in 11/2020.  - REVIEW OF HEALTH MAINTENANCE PROTOCOL ORDERS  - HIV Antigen Antibody Combo; Future  - Hepatitis C Screen Reflex to HCV RNA Quant and Genotype; Future  - HC FLU VAC PRESRV FREE QUAD SPLIT VIR > 6 MONTHS IM ([id number])  - Comprehensive metabolic panel (BMP + Alb, Alk Phos, ALT, AST, Total. Bili, TP); Future  - Vitamin D deficiency screening; Future  - Lipid panel reflex to direct LDL Fasting; Future    2. Need for hepatitis C screening test  - Hepatitis C Screen Reflex to HCV RNA Quant and Genotype; Future    3. Hypothyroidism due to Hashimoto's thyroiditis  4. Hashimoto's thyroiditis  Pt TSH was [facility] at [facility] in 1/2022 but given severe fatigue symptoms she has been increased the dose of LT , recheck of TSH yesterday 2/24/22 as she shows me on the cell phone today remains normal after 5 weeks and pt would like to continue the same dose of 50 mcg increased dose as she feels much better after this. She does have enough refills for one year given by her OBGYN and will let us know if she requires any.     5. Screening for HIV (human immunodeficiency virus)  - HIV Antigen Antibody Combo; Future    6. Hypokalemia  - Comprehensive metabolic panel (BMP + Alb, Alk Phos, ALT, AST, Total. Bili, TP); Future    7. Dyslipidemia  - Lipid panel reflex to direct LDL Fasting;  Future    8. Encounter for vitamin deficiency screening  - Vitamin D deficiency screening; Future    9. Encounter for screening mammogram for breast cancer  - MA Screen Bilateral w/Andrés; Future     Patient Instructions   As discussed, please make LAB only appointment for getting fasting labs.     ======================      Preventive Health Recommendations  Female Ages 40 to 49    Yearly exam:     See your health care provider every year in order to  1. Review health changes.   2. Discuss preventive care.    3. Review your medicines if your doctor prescribed any.      Get a Pap test every three years (unless you have an abnormal result and your provider advises testing more often).      If you get Pap tests with HPV test, you only need to test every 5 years, unless you have an abnormal result. You do not need a Pap test if your uterus was removed (hysterectomy) and you have not had cancer.      You should be tested each year for STDs (sexually transmitted diseases), if you're at risk.     Ask your doctor if you should have a mammogram.      Have a colonoscopy (test for colon cancer) if someone in your family has had colon cancer or polyps before age 50.       Have a cholesterol test every 5 years.       Have a diabetes test (fasting glucose) after age 45. If you are at risk for diabetes, you should have this test every 3 years.    Shots: Get a flu shot each year. Get a tetanus shot every 10 years.     Nutrition:     Eat at least 5 servings of fruits and vegetables each day.    Eat whole-grain bread, whole-wheat pasta and brown rice instead of white grains and rice.    Get adequate Calcium and Vitamin D.      Lifestyle    Exercise at least 150 minutes a week (an average of 30 minutes a day, 5 days a week). This will help you control your weight and prevent disease.    Limit alcohol to one drink per day.    No smoking.     Wear sunscreen to prevent skin cancer.    See your dentist every six months for an exam and  "cleaning.    Return in about 6 months (around 8/25/2022), or if symptoms worsen or fail to improve, for Follow up of last visit.    Savita Jerome MD  [facility]      Patient has been advised of split billing requirements and indicates understanding: Yes    COUNSELING:  Reviewed preventive health counseling, as reflected in patient instructions  Special attention given to:        Regular exercise       Healthy diet/nutrition       Vision screening       Hearing screening       Contraception       Consider Hep C screening for all patients one time for ages 18-79 years    Estimated body mass index is 21.1 kg/m  as calculated from the following:    Height as of 3/10/21: 1.74 m (5' 8.5\").    Weight as of this encounter: 63.9 kg (140 lb 12.8 oz).        She reports that she has never smoked. She has never used smokeless tobacco.      Counseling Resources:  ATP IV Guidelines  Pooled Cohorts Equation Calculator  Breast Cancer Risk Calculator  BRCA-Related Cancer Risk Assessment: FHS-7 Tool  FRAX Risk Assessment  ICSI Preventive Guidelines  Dietary Guidelines for Americans, 2010  USDA's MyPlate  ASA Prophylaxis  Lung CA Screening    Savita Jerome MD  [facility] [place]  "

## 2022-03-01 ENCOUNTER — LAB (OUTPATIENT)
Dept: LAB | Facility: CLINIC | Age: 42
End: 2022-03-01
Payer: COMMERCIAL

## 2022-03-01 DIAGNOSIS — E87.6 HYPOKALEMIA: ICD-10-CM

## 2022-03-01 DIAGNOSIS — E78.5 DYSLIPIDEMIA: ICD-10-CM

## 2022-03-01 DIAGNOSIS — Z00.00 ROUTINE GENERAL MEDICAL EXAMINATION AT A HEALTH CARE FACILITY: ICD-10-CM

## 2022-03-01 DIAGNOSIS — Z11.59 NEED FOR HEPATITIS C SCREENING TEST: ICD-10-CM

## 2022-03-01 DIAGNOSIS — Z13.21 ENCOUNTER FOR VITAMIN DEFICIENCY SCREENING: ICD-10-CM

## 2022-03-01 DIAGNOSIS — Z11.4 SCREENING FOR HIV (HUMAN IMMUNODEFICIENCY VIRUS): ICD-10-CM

## 2022-03-01 LAB
ALBUMIN SERPL-MCNC: 4.3 G/DL (ref 3.4–5)
ALP SERPL-CCNC: 53 U/L (ref 40–150)
ALT SERPL W P-5'-P-CCNC: 21 U/L (ref 0–50)
ANION GAP SERPL CALCULATED.3IONS-SCNC: 8 MMOL/L (ref 3–14)
AST SERPL W P-5'-P-CCNC: 16 U/L (ref 0–45)
BILIRUB SERPL-MCNC: 0.8 MG/DL (ref 0.2–1.3)
BUN SERPL-MCNC: 12 MG/DL (ref 7–30)
CALCIUM SERPL-MCNC: 9.4 MG/DL (ref 8.5–10.1)
CHLORIDE BLD-SCNC: 104 MMOL/L (ref 94–109)
CHOLEST SERPL-MCNC: 203 MG/DL
CO2 SERPL-SCNC: 26 MMOL/L (ref 20–32)
CREAT SERPL-MCNC: 0.92 MG/DL (ref 0.52–1.04)
FASTING STATUS PATIENT QL REPORTED: YES
GFR SERPL CREATININE-BSD FRML MDRD: 80 ML/MIN/1.73M2
GLUCOSE BLD-MCNC: 88 MG/DL (ref 70–99)
HDLC SERPL-MCNC: 60 MG/DL
LDLC SERPL CALC-MCNC: 130 MG/DL
NONHDLC SERPL-MCNC: 143 MG/DL
POTASSIUM BLD-SCNC: 3.7 MMOL/L (ref 3.4–5.3)
PROT SERPL-MCNC: 7.9 G/DL (ref 6.8–8.8)
SODIUM SERPL-SCNC: 138 MMOL/L (ref 133–144)
TRIGL SERPL-MCNC: 63 MG/DL

## 2022-03-01 PROCEDURE — 36415 COLL VENOUS BLD VENIPUNCTURE: CPT

## 2022-03-01 PROCEDURE — 86803 HEPATITIS C AB TEST: CPT

## 2022-03-01 PROCEDURE — 87389 HIV-1 AG W/HIV-1&-2 AB AG IA: CPT

## 2022-03-01 PROCEDURE — 80061 LIPID PANEL: CPT

## 2022-03-01 PROCEDURE — 80053 COMPREHEN METABOLIC PANEL: CPT

## 2022-03-01 PROCEDURE — 82306 VITAMIN D 25 HYDROXY: CPT

## 2022-03-02 LAB
DEPRECATED CALCIDIOL+CALCIFEROL SERPL-MC: 91 UG/L (ref 20–75)
HCV AB SERPL QL IA: NONREACTIVE
HIV 1+2 AB+HIV1 P24 AG SERPL QL IA: NONREACTIVE

## 2022-03-04 ENCOUNTER — TELEPHONE (OUTPATIENT)
Dept: FAMILY MEDICINE | Facility: CLINIC | Age: 42
End: 2022-03-04
Payer: COMMERCIAL

## 2022-03-04 NOTE — RESULT ENCOUNTER NOTE
Your Cholesterol LDL is borderline normal. Given your age and no cardiac risk factors , recommend dietery management of avoiding high fat foods and red meat .    Your Vitamin D levels are abnormally high causing toxicity . Please hold off any supplements you are taking for 2 weeks and later start only on 1000  Mcg daily.     Please let me know if you have any questions.  Savita Jerome MD on 3/3/2022 at 9:37 PM  [facility]

## 2022-03-04 NOTE — TELEPHONE ENCOUNTER
----- Message from Savita Jerome MD sent at 3/3/2022  9:40 PM CST -----  Your Cholesterol LDL is borderline normal. Given your age and no cardiac risk factors , recommend dietery management of avoiding high fat foods and red meat .    Your Vitamin D levels are abnormally high causing toxicity . Please hold off any supplements you are taking for 2 weeks and later start only on 1000  Mcg daily.     Please let me know if you have any questions.  Savita Jerome MD on 3/3/2022 at 9:37 PM  [facility]

## 2022-03-31 ENCOUNTER — E-VISIT (OUTPATIENT)
Dept: FAMILY MEDICINE | Facility: CLINIC | Age: 42
End: 2022-03-31
Payer: COMMERCIAL

## 2022-03-31 DIAGNOSIS — J01.90 ACUTE SINUSITIS WITH SYMPTOMS > 10 DAYS: ICD-10-CM

## 2022-03-31 DIAGNOSIS — Z20.822 SUSPECTED COVID-19 VIRUS INFECTION: ICD-10-CM

## 2022-03-31 PROCEDURE — 99422 OL DIG E/M SVC 11-20 MIN: CPT | Performed by: INTERNAL MEDICINE

## 2022-03-31 RX ORDER — FEXOFENADINE HCL 60 MG/1
60 TABLET, FILM COATED ORAL 2 TIMES DAILY
Qty: 60 TABLET | Refills: 11 | Status: SHIPPED | OUTPATIENT
Start: 2022-03-31 | End: 2022-07-06

## 2022-03-31 RX ORDER — FLUTICASONE PROPIONATE 50 MCG
1 SPRAY, SUSPENSION (ML) NASAL DAILY
Qty: 18.2 ML | Refills: 1 | Status: SHIPPED | OUTPATIENT
Start: 2022-03-31 | End: 2022-07-06

## 2022-03-31 NOTE — PATIENT INSTRUCTIONS
You may want to try warm salt water gargles or rinses to feel better or help prevent another bout in the future. Mix 1 teaspoon of salt in 8 ounces of water, gargle, and spit. Do this several times a day for several days. Do not swallow the mixture.      Allergic Rhinitis  Allergic rhinitis is an allergic reaction that affects the nose, and often the eyes. It s often known as nasal allergies. Nasal allergies are often due to things in the environment that are breathed in. Depending what you are sensitive to, nasal allergies may occur only during certain seasons, or they may occur year round. Common indoor allergens include house dust mites, mold, cockroaches, and pet dander. Outdoor allergens include pollen from trees, grasses, and weeds.    Symptoms include a drippy, stuffy, and itchy nose. They also include sneezing and red and itchy eyes. You may feel tired more often. Severe allergies may also affect your breathing and trigger a condition called asthma.    Tests can be done to see what allergens are affecting you. You may be referred to an allergy specialist for testing and further evaluation.   Home care  Your healthcare provider may prescribe medicines to help relieve allergy symptoms. These may include oral medicines, nasal sprays, or eye drops.   Ask your provider for advice on how to stay away from substances that you are allergic to. Below are a few tips for each type of allergen.   Pet dander:    Do not have pets with fur and feathers.    If you have a pet, keep it out of your bedroom and off upholstered furniture.  Pollen:    When pollen counts are high, keep windows of your car and home closed. If possible, use an air conditioner instead.    Wear a filter mask when mowing or doing yard work.  House dust mites:    Wash bedding every week in warm water and detergent and dry on a hot setting.    Cover the mattress, box spring, and pillows with allergy covers.     If possible, sleep in a room with no  carpet, curtains, or upholstered furniture.  Cockroaches:    Store food in sealed containers.    Remove garbage from the home promptly.    Fix water leaks.  Mold:    Keep humidity low by using a dehumidifier or air conditioner. Keep the dehumidifier and air conditioner clean and free of mold.    Clean moldy areas with bleach and water. Don't mix bleach with other .  In general:    Vacuum once or twice a week. If possible, use a vacuum with a high-efficiency particulate air (HEPA) filter.    Don't smoke. Stay away from cigarette smoke. Cigarette smoke is an irritant that can make symptoms worse.  Follow-up care  Follow up as advised by the healthcare provider or our staff. If you were referred to an allergy specialist, make this appointment promptly.   When to seek medical advice  Call your healthcare provider or get medical care right away if the following occur:     Coughing    Fever of 100.4 F (38 C) or higher, or as directed by your healthcare provider    Raised red bumps (hives)    Continuing symptoms, new symptoms, or worsening symptoms  Call 911  Call 911 if you have:     Trouble breathing    Severe swelling of the face or severe itching of the eyes or mouth    Wheezing or shortness of breath    Chest tightness    Dizziness or lightheadedness    Feeling of doom    Stomach pain, bloating, vomiting, or diarrhea  [employer] last reviewed this educational content on 10/1/2019    2281-6723 [employer]. All rights reserved. This information is not intended as a substitute for professional medical care. Always follow your healthcare professional's instructions.          Sinusitis (Antibiotic Treatment)    The sinuses are air-filled spaces within the bones of the face. They connect to the inside of the nose. Sinusitis is an inflammation of the tissue that lines the sinuses. Sinusitis can occur during a cold. It can also happen due to allergies to pollens and other particles in the air. Sinusitis  can cause symptoms of sinus congestion and a feeling of fullness. A sinus infection causes fever, headache, and facial pain. There is often green or yellow fluid draining from the nose or into the back of the throat (post-nasal drip). You have been given antibiotics to treat this condition.   Home care    Take the full course of antibiotics as instructed. Don't stop taking them, even when you feel better.    Drink plenty of water, hot tea, and other liquids as directed by the healthcare provider. This may help thin nasal mucus. It also may help your sinuses drain fluids.    Heat may help soothe painful areas of your face. Use a towel soaked in hot water. Or,  the shower and direct the warm spray onto your face. Using a vaporizer along with a menthol rub at night may also help soothe symptoms.     An expectorant with guaifenesin may help thin nasal mucus and help your sinuses drain fluids. Talk with your provider or pharmacists before taking an over-the-counter (OTC) medicine if you have any questions about it or its side effects..    You can use an OTC decongestant, unless a similar medicine was prescribed to you. Nasal sprays work the fastest. Use one that contains phenylephrine or oxymetazoline. First blow your nose gently. Then use the spray. Don't use these medicines more often than directed on the label. If you do, your symptoms may get worse. You may also take pills that contain pseudoephedrine. Don t use products that combine multiple medicines. This is because side effects may be increased. Read labels. You can also ask the pharmacist for help. (People with high blood pressure should not use decongestants. They can raise blood pressure.) Talk with your provider or pharmacist if you have any questions about the medicine..    OTC antihistamines may help if allergies contributed to your sinusitis. Talk with your provider or pharmacist if you have any questions about the medicine..    Don't use nasal  rinses or irrigation during an acute sinus infection, unless your healthcare provider tells you to. Rinsing may spread the infection to other areas in your sinuses.    Use acetaminophen or ibuprofen to control pain, unless another pain medicine was prescribed to you. If you have chronic liver or kidney disease or ever had a stomach ulcer, talk with your healthcare provider before using these medicines. Never give aspirin to anyone under age 18 who is ill with a fever. It may cause severe liver damage.    Don't smoke. This can make symptoms worse.    Follow-up care  Follow up with your healthcare provider, or as advised.   When to seek medical advice  Call your healthcare provider if any of these occur:     Facial pain or headache that gets worse    Stiff neck    Unusual drowsiness or confusion    Swelling of your forehead or eyelids    Symptoms don't go away in 10 days    Vision problems, such as blurred or double vision    Fever of 100.4 F (38 C) or higher, or as directed by your healthcare provider  Call 911  Call 911 if any of these occur:     Seizure    Trouble breathing    Feeling dizzy or faint    Fingernails, skin or lips look blue, purple , or gray  Prevention  Here are steps you can take to help prevent an infection:     Keep good hand washing habits.    Don t have close contact with people who have sore throats, colds, or other upper respiratory infections.    Don t smoke, and stay away from secondhand smoke.    Stay up to date with of your vaccines.  [employer] last reviewed this educational content on 12/1/2019 2000-2021 [employer]. All rights reserved. This information is not intended as a substitute for professional medical care. Always follow your healthcare professional's instructions.        Dear Kaley Hart    After reviewing your responses, I've been able to diagnose you with?a sinus infection caused by bacteria.?     Based on your responses and diagnosis, I have prescribed  Augmentin to treat your symptoms. I have sent this to your pharmacy.?     It is also important to stay well hydrated, get lots of rest and take over-the-counter decongestants,?tylenol?or ibuprofen if you?are able to?take those medications per your primary care provider to help relieve discomfort.?     It is important that you take?all of?your prescribed medication even if your symptoms are improving after a few doses.? Taking?all of?your medicine helps prevent the symptoms from returning.?     If your symptoms worsen, you develop severe headache, vomiting, high fever (>102), or are not improving in 7 days, please contact your primary care provider for an appointment or visit any of our convenient Walk-in Care or Urgent Care Centers to be seen which can be found on our website?here.?     Thanks again for choosing?us?as your health care partner,?   ?  Savita Jerome MD?

## 2022-03-31 NOTE — TELEPHONE ENCOUNTER
Provider E-Visit time total (minutes): 12 minutes    [employer] message sent in as below -     Wesly Roche,   I have reviewed your symptoms and checked your past antibiotic usage. Sent in a course of Augmentin and symptomatic treatment .   I do see a referral to ENT given in the past for your Sinus issues. If no improvement recommend to take appointment with ENT for possible need of Sinus wash.   Please let me know if you have any questions.     Thank you,  Savita Jerome MD on 3/31/2022 at 7:26 AM

## 2022-04-01 ENCOUNTER — LAB (OUTPATIENT)
Dept: URGENT CARE | Facility: URGENT CARE | Age: 42
End: 2022-04-01
Attending: INTERNAL MEDICINE
Payer: COMMERCIAL

## 2022-04-01 DIAGNOSIS — Z20.822 SUSPECTED COVID-19 VIRUS INFECTION: ICD-10-CM

## 2022-04-01 LAB — SARS-COV-2 RNA RESP QL NAA+PROBE: NEGATIVE

## 2022-04-01 PROCEDURE — U0003 INFECTIOUS AGENT DETECTION BY NUCLEIC ACID (DNA OR RNA); SEVERE ACUTE RESPIRATORY SYNDROME CORONAVIRUS 2 (SARS-COV-2) (CORONAVIRUS DISEASE [COVID-19]), AMPLIFIED PROBE TECHNIQUE, MAKING USE OF HIGH THROUGHPUT TECHNOLOGIES AS DESCRIBED BY CMS-2020-01-R: HCPCS

## 2022-04-01 PROCEDURE — U0005 INFEC AGEN DETEC AMPLI PROBE: HCPCS

## 2022-04-10 ENCOUNTER — E-VISIT (OUTPATIENT)
Dept: FAMILY MEDICINE | Facility: CLINIC | Age: 42
End: 2022-04-10
Payer: COMMERCIAL

## 2022-04-10 DIAGNOSIS — B37.31 CANDIDAL VULVOVAGINITIS: ICD-10-CM

## 2022-04-10 DIAGNOSIS — B37.31 CANDIDIASIS OF VAGINA: Primary | ICD-10-CM

## 2022-04-10 PROCEDURE — 99422 OL DIG E/M SVC 11-20 MIN: CPT | Performed by: INTERNAL MEDICINE

## 2022-04-10 RX ORDER — FLUCONAZOLE 150 MG/1
150 TABLET ORAL DAILY
Qty: 3 TABLET | Refills: 0 | Status: SHIPPED | OUTPATIENT
Start: 2022-04-10 | End: 2022-04-13

## 2022-04-10 NOTE — TELEPHONE ENCOUNTER
Provider E-Visit time total (minutes): 11 minutes    [employer] message sent to patient as below -     Wesly Roche,     Given your recent antibiotic usage and milky white discharge with soreness this appears as Vaginal yeast infection. I have sent in emperic antifungal for possible vaginal candidiasis to your pharmacy.     Please let me know if you have any questions.  Savita Jerome MD on 4/10/2022 at 3:49 PM

## 2022-04-10 NOTE — PATIENT INSTRUCTIONS
Thank you for choosing us for your care. I have placed an order for a prescription so that you can start treatment. View your full visit summary for details by clicking on the link below. Your pharmacist will able to address any questions you may have about the medication.     If you re not feeling better within 2-3 days, please schedule an appointment.  You can schedule an appointment right here in [place], or call [phone]  If the visit is for the same symptoms as your eVisit, we ll refund the cost of your eVisit if seen within seven days.      Yeast Infection (Candida Vaginal Infection)    You have a Candida vaginal infection. This is also known as a yeast infection. It's most often caused by a type of yeast (fungus) called Candida. Candida are normally found in the vagina. But if they increase in number, this can lead to infection and cause symptoms.   Symptoms of a yeast infection can include:     Clumpy or thin, white discharge, which may look like cottage cheese    Itching or burning    Burning with urination  Certain factors can make a yeast infection more likely. These can include:     Taking certain medicines, such as antibiotics or birth control pills    Pregnancy    Diabetes    Weak immune system  A yeast infection is most often treated with antifungal medicine. This may be given as a vaginal cream or pills you take by mouth. Treatment may last for about 1 to 7 days. Women with severe or recurrent infections may need longer courses of treatment.   Home care    If you re prescribed medicine, be sure to use it as directed. Finish all of the medicine, even if your symptoms go away. Don t try to treat yourself using over-the-counter products without talking with your provider first. They will let you know if this is a good option for you.    Ask your provider what steps you can take to help reduce your risk of having a yeast infection in the future.    Follow-up care  Follow up with your healthcare  provider, or as directed.   When to seek medical advice  Call your healthcare provider right away if:     You have a fever of 100.4 F (38 C) or higher, or as directed by your provider.    Your symptoms worsen, or they don t go away within a few days of starting treatment.    You have new pain in the lower belly or pelvic region.    You have side effects that bother you or a reaction to the cream or pills you re prescribed.    You or any partners you have sex with have new symptoms, such as a rash, joint pain, or sores.  [employer] last reviewed this educational content on 7/1/2020 2000-2021 [employer]. All rights reserved. This information is not intended as a substitute for professional medical care. Always follow your healthcare professional's instructions.

## 2022-05-23 DIAGNOSIS — E06.3 HYPOTHYROIDISM DUE TO HASHIMOTO'S THYROIDITIS: Primary | ICD-10-CM

## 2022-05-24 RX ORDER — LEVOTHYROXINE SODIUM 50 UG/1
TABLET ORAL
Qty: 90 TABLET | Refills: 0 | Status: SHIPPED | OUTPATIENT
Start: 2022-05-24 | End: 2023-09-11

## 2022-05-24 NOTE — TELEPHONE ENCOUNTER
Routing refill request to provider for review/approval because:  Medication is reported/historical  Anaid Connors RN

## 2022-06-20 ENCOUNTER — APPOINTMENT (OUTPATIENT)
Dept: GENERAL RADIOLOGY | Facility: CLINIC | Age: 42
End: 2022-06-20
Attending: EMERGENCY MEDICINE
Payer: COMMERCIAL

## 2022-06-20 ENCOUNTER — E-VISIT (OUTPATIENT)
Dept: FAMILY MEDICINE | Facility: CLINIC | Age: 42
End: 2022-06-20
Payer: COMMERCIAL

## 2022-06-20 ENCOUNTER — HOSPITAL ENCOUNTER (EMERGENCY)
Facility: CLINIC | Age: 42
Discharge: HOME | End: 2022-06-20
Attending: EMERGENCY MEDICINE | Admitting: EMERGENCY MEDICINE
Payer: COMMERCIAL

## 2022-06-20 VITALS
OXYGEN SATURATION: 100 % | RESPIRATION RATE: 18 BRPM | TEMPERATURE: 97.5 F | SYSTOLIC BLOOD PRESSURE: 113 MMHG | HEART RATE: 66 BPM | BODY MASS INDEX: 21.28 KG/M2 | WEIGHT: 142 LBS | DIASTOLIC BLOOD PRESSURE: 81 MMHG

## 2022-06-20 DIAGNOSIS — R07.9 CHEST PAIN, UNSPECIFIED TYPE: ICD-10-CM

## 2022-06-20 DIAGNOSIS — R07.9 CHEST PAIN, UNSPECIFIED TYPE: Primary | ICD-10-CM

## 2022-06-20 LAB
ANION GAP SERPL CALCULATED.3IONS-SCNC: 5 MMOL/L (ref 3–14)
BASOPHILS # BLD AUTO: 0 10E3/UL (ref 0–0.2)
BASOPHILS NFR BLD AUTO: 0 %
BUN SERPL-MCNC: 13 MG/DL (ref 7–30)
CALCIUM SERPL-MCNC: 8.4 MG/DL (ref 8.5–10.1)
CHLORIDE BLD-SCNC: 106 MMOL/L (ref 94–109)
CO2 SERPL-SCNC: 27 MMOL/L (ref 20–32)
CREAT SERPL-MCNC: 0.84 MG/DL (ref 0.52–1.04)
EOSINOPHIL # BLD AUTO: 0.1 10E3/UL (ref 0–0.7)
EOSINOPHIL NFR BLD AUTO: 2 %
ERYTHROCYTE [DISTWIDTH] IN BLOOD BY AUTOMATED COUNT: 12 % (ref 10–15)
GFR SERPL CREATININE-BSD FRML MDRD: 90 ML/MIN/1.73M2
GLUCOSE BLD-MCNC: 92 MG/DL (ref 70–99)
HCT VFR BLD AUTO: 40.4 % (ref 35–47)
HGB BLD-MCNC: 13.6 G/DL (ref 11.7–15.7)
HOLD SPECIMEN: NORMAL
IMM GRANULOCYTES # BLD: 0 10E3/UL
IMM GRANULOCYTES NFR BLD: 0 %
LYMPHOCYTES # BLD AUTO: 2.2 10E3/UL (ref 0.8–5.3)
LYMPHOCYTES NFR BLD AUTO: 39 %
MCH RBC QN AUTO: 32.1 PG (ref 26.5–33)
MCHC RBC AUTO-ENTMCNC: 33.7 G/DL (ref 31.5–36.5)
MCV RBC AUTO: 95 FL (ref 78–100)
MONOCYTES # BLD AUTO: 0.4 10E3/UL (ref 0–1.3)
MONOCYTES NFR BLD AUTO: 6 %
NEUTROPHILS # BLD AUTO: 3 10E3/UL (ref 1.6–8.3)
NEUTROPHILS NFR BLD AUTO: 53 %
NRBC # BLD AUTO: 0 10E3/UL
NRBC BLD AUTO-RTO: 0 /100
PLATELET # BLD AUTO: 198 10E3/UL (ref 150–450)
POTASSIUM BLD-SCNC: 3.6 MMOL/L (ref 3.4–5.3)
RBC # BLD AUTO: 4.24 10E6/UL (ref 3.8–5.2)
SODIUM SERPL-SCNC: 138 MMOL/L (ref 133–144)
TROPONIN I SERPL HS-MCNC: 3 NG/L
WBC # BLD AUTO: 5.6 10E3/UL (ref 4–11)

## 2022-06-20 PROCEDURE — 85014 HEMATOCRIT: CPT | Performed by: EMERGENCY MEDICINE

## 2022-06-20 PROCEDURE — 80048 BASIC METABOLIC PNL TOTAL CA: CPT | Performed by: EMERGENCY MEDICINE

## 2022-06-20 PROCEDURE — 99284 EMERGENCY DEPT VISIT MOD MDM: CPT | Mod: 25

## 2022-06-20 PROCEDURE — 71046 X-RAY EXAM CHEST 2 VIEWS: CPT

## 2022-06-20 PROCEDURE — 84484 ASSAY OF TROPONIN QUANT: CPT | Performed by: EMERGENCY MEDICINE

## 2022-06-20 PROCEDURE — 36415 COLL VENOUS BLD VENIPUNCTURE: CPT | Performed by: EMERGENCY MEDICINE

## 2022-06-20 PROCEDURE — 99207 PR NON-BILLABLE SERV PER CHARTING: CPT | Performed by: INTERNAL MEDICINE

## 2022-06-20 NOTE — PATIENT INSTRUCTIONS
Thank you for choosing us for your care. Based on the information provided, I believe you need to be seen immediately.  Please go the emergency department as soon as possible.     You will not be charged for this eVisit.

## 2022-06-20 NOTE — TELEPHONE ENCOUNTER
Provider E-Visit time total (minutes):     Sent in [employer] message as below.     Hi Kaley,     I am concerned to hear your symptoms and will need immediate evaluation including EKG and lab work to make sure this is not related to your heart. Please go to ER immediately. You will not be charged for this E-visit.     Thank you,  Savita Jerome MD on 6/20/2022 at 4:58 PM

## 2022-06-21 LAB
ATRIAL RATE - MUSE: 73 BPM
DIASTOLIC BLOOD PRESSURE - MUSE: NORMAL MMHG
INTERPRETATION ECG - MUSE: NORMAL
P AXIS - MUSE: 69 DEGREES
PR INTERVAL - MUSE: 130 MS
QRS DURATION - MUSE: 74 MS
QT - MUSE: 390 MS
QTC - MUSE: 429 MS
R AXIS - MUSE: 84 DEGREES
SYSTOLIC BLOOD PRESSURE - MUSE: NORMAL MMHG
T AXIS - MUSE: 75 DEGREES
VENTRICULAR RATE- MUSE: 73 BPM

## 2022-06-21 NOTE — ED TRIAGE NOTES
Presents to triage with c/o L anterior chest pain that has been intermittent for about a week and a half. Patient also endorses intermittent SOB that coincides with the chest pain and intermittent dizziness. No cardiac hx.      Triage Assessment     Row Name 06/20/22 [id number]       Triage Assessment (Adult)    Airway WDL WDL       Respiratory WDL    Respiratory WDL WDL       Cardiac WDL    Cardiac WDL X;chest pain       Chest Pain Assessment    Chest Pain Location anterior chest, left

## 2022-06-21 NOTE — ED PROVIDER NOTES
[facility] Provider Note  [facility] Emergency Department  12:30 AM  6/21/2022    Kaley Hart  41 year oldfemale    Chief Complaint   Patient presents with     Chest Pain       HPI:    41-year-old female with no known heart or lung problems here for evaluation of intermittent chest discomfort over the last 1-1/2 weeks.  Patient has been having episodes of left anterior chest discomfort described as tightness.  Episodes last a couple of hours.  These happen randomly no consistent pattern of position, exertion, inspiration, postprandial.  Has not noticed any consistent intervention that shortens the length of time of these.  No recent travel, no contraceptives, no known history of VTE.  No lower extremity pain or swelling.  No recent prolonged travel or trauma or known malignancy.  No family history of early cardiac death.  Denies any skin changes over the area of the pain.    ROS: 10 point ROS completed and negative other than mentioned above    Past Medical History:   Diagnosis Date     Other acne      Thyroid disease 11/17/2017     Unspecified contraceptive management     Nuva ring     Past Surgical History:   Procedure Laterality Date     ABDOMEN SURGERY  01/30/2021    Laproscopic Appendectomy     ENT SURGERY      wisdom teeth removed     EYE SURGERY  2006    Lasik     LAPAROSCOPIC APPENDECTOMY N/A 1/30/2021    Procedure: APPENDECTOMY, LAPAROSCOPIC;  Surgeon: Erum Merlos MD;  Location: [facility] OR     NO HISTORY OF SURGERY           Social History     Tobacco Use     Smoking status: Never Smoker     Smokeless tobacco: Never Used   Substance Use Topics     Alcohol use: Yes     Comment: occasionally     Drug use: No         No current facility-administered medications on file prior to encounter.  calcium carbonate (OS-BRICE) 1500 (600 Ca) MG tablet, Take by mouth 2 times daily (with meals)  fexofenadine (ALLEGRA) 60 MG tablet, Take 1 tablet (60 mg) by mouth 2 times daily  fluticasone (FLONASE) 50 MCG/ACT nasal  spray, Spray 1 spray into both nostrils daily  lactobacillus rhamnosus, GG, (CULTURELL) capsule, Take 1 capsule by mouth 2 times daily  levothyroxine (SYNTHROID/LEVOTHROID) 50 MCG tablet, TAKE 1 TABLET BY MOUTH DAILY  multivitamin, therapeutic with minerals (MULTI-VITAMIN) TABS, Take 1 tablet by mouth daily  vitamin (B COMPLEX-C) tablet, Take 1 tablet by mouth daily             Allergies   Allergen Reactions     Codeine Anaphylaxis and Other (See Comments)     Mold Difficulty breathing and Other (See Comments)     Seasonal Allergies Other (See Comments)         Physical Exam  Vitals: /81   Pulse 66   Temp 97.5  F (36.4  C) (Temporal)   Resp 18   Wt 64.4 kg (142 lb)   SpO2 100%   BMI 21.28 kg/m    Gen: well appearing, in no acute distress  HEENT:  mmm, no rhinorrhea  Neck: supple, no abnormal swelling  Lungs:  CTAB,  no resp distress  CV: rrr, no m/r/g, ppi  Abd: soft, nontender, nondistended, no rebound/masses/guarding/hsm  Ext: no peripheral edema  Skin: warm, dry, well perfused, no rashes/bruising/lesions on exposed skin  Neuro: alert, MAEE, no gross motor or sensory deficits, gait stable  Psych: Normal mood, normal affect      Labs and Imaging:    Labs Ordered and Resulted from Time of ED Arrival to Time of ED Departure   BASIC METABOLIC PANEL - Abnormal       Result Value    Sodium 138      Potassium 3.6      Chloride 106      Carbon Dioxide (CO2) 27      Anion Gap 5      Urea Nitrogen 13      Creatinine 0.84      Calcium 8.4 (*)     Glucose 92      GFR Estimate 90     TROPONIN I - Normal    Troponin I High Sensitivity 3     CBC WITH PLATELETS AND DIFFERENTIAL    WBC Count 5.6      RBC Count 4.24      Hemoglobin 13.6      Hematocrit 40.4      MCV 95      MCH 32.1      MCHC 33.7      RDW 12.0      Platelet Count 198      % Neutrophils 53      % Lymphocytes 39      % Monocytes 6      % Eosinophils 2      % Basophils 0      % Immature Granulocytes 0      NRBCs per 100 WBC 0      Absolute Neutrophils 3.0       Absolute Lymphocytes 2.2      Absolute Monocytes 0.4      Absolute Eosinophils 0.1      Absolute Basophils 0.0      Absolute Immature Granulocytes 0.0      Absolute NRBCs 0.0            XR Chest 2 Views   Final Result   IMPRESSION: Negative chest.               ED Medications:   Medications - No data to display          Medical Decision Makin-year-old female here with left anterior chest pain intermittent over the last 1-1/2 weeks.  No consistent pattern of exertional discomfort.  EKG shows no ischemic findings or concerns for malignant arrhythmia.  Troponin is negative which given her story I think makes an occlusive coronary process quite unlikely.  No clinical concern for pulmonary embolism.  Chest radiograph unremarkable.  Low clinical suspicion for dissection or infectious process.  At this point and she has been risk ratified low enough to be discharged home follow clinically as the likelihood of a significant cardiovascular or cardiopulmonary etiology is quite low.  We discussed what was known and unknown based on test results today what to watch out for with prompt return here to the emergency department.  We discussed other possible etiologies including musculoskeletal pain costochondritis or GERD.  She was comfortable with the plan of discharge home.      Diagnosis:    ICD-10-CM    1. Chest pain, unspecified type  R07.9          Disposition:  Home      Pradeep Crockett MD  [facility]       Pradeep Crockett MD  22 [phone]

## 2022-06-21 NOTE — ED TRIAGE NOTES
Triage Assessment     Row Name 06/20/22 [id number]       Triage Assessment (Adult)    Airway WDL WDL       Respiratory WDL    Respiratory WDL WDL       Cardiac WDL    Cardiac WDL X;chest pain       Chest Pain Assessment    Chest Pain Location anterior chest, left

## 2022-07-06 ENCOUNTER — VIRTUAL VISIT (OUTPATIENT)
Dept: FAMILY MEDICINE | Facility: CLINIC | Age: 42
End: 2022-07-06
Payer: COMMERCIAL

## 2022-07-06 DIAGNOSIS — R07.89 ATYPICAL CHEST PAIN: ICD-10-CM

## 2022-07-06 DIAGNOSIS — D18.01 CAPILLARY HEMANGIOMA OF SKIN: ICD-10-CM

## 2022-07-06 DIAGNOSIS — Z12.31 ENCOUNTER FOR SCREENING MAMMOGRAM FOR BREAST CANCER: ICD-10-CM

## 2022-07-06 DIAGNOSIS — M79.622 LEFT AXILLARY PAIN: Primary | ICD-10-CM

## 2022-07-06 PROCEDURE — 99213 OFFICE O/P EST LOW 20 MIN: CPT | Mod: 95 | Performed by: INTERNAL MEDICINE

## 2022-07-06 NOTE — PROGRESS NOTES
Kaley is a 41 year old who is being evaluated via a billable video visit.      How would you like to obtain your AVS? MyChart  If the video visit is dropped, the invitation should be resent by: Text to cell phone: [phone]   Will anyone else be joining your video visit? No      Assessment and Plan  1. Left axillary pain  New problem, which pt attributes to her recent completion of antibiotic amoxicillin given by dentist. Denies any sensation of lump in the axilla. Severity 3/10 intensity of pain . Will do USG left breast and placed screening mammogram which she is due at this time   - US Breast Left Limited 1-3 Quadrants; Future    2. Encounter for screening mammogram for breast cancer  - *MA Screening Digital Bilateral; Future    3. Atypical chest pain   Recent ER visist on 6/20/22 for chest pain and was discharged home with Atypical chest opain as it was negative for ACS.     4. Capillary hemangioma of skin  New problem, skin lesion on the toe is appearing as Capillary hemangioma with limited physical exam on video visit. Reassured that this is benign lesion.        Patient Instructions   As discussed , given your symptoms of left axillary pain which is bothering you will get the required Ultrasound and Screening mammogram and make santos its normal. Orders placed.   To take Ibuprofen as needed for improvement of the inflammation if any.     =====================        No follow-ups on file.    Savita Jerome MD  [facility]      Juwan Roche is a 41 year old presenting for the following health issues:  Hospital F/U      hospitals     ED/UC Followup:    Facility:  [facility]  Date of visit: 6/20/2022  Reason for visit: Chest Pains  Current Status: Chest pains have mostly resolved. Pt is having some soreness and tenderness on the left side of her chest. Touching it makes it worse. There is also a spot on left foot on the outer side of big toe. It is dark brown / red in  color.       Last seen pt on 2/2022 for ANnual physical. She has been doing E-visit for Sinus flareups x2 in the interim. But when she developed chest pain , shewas referred to ER on the RN triage response by me.       Allergies   Allergen Reactions     Codeine Anaphylaxis and Other (See Comments)     Mold Difficulty breathing and Other (See Comments)     Seasonal Allergies Other (See Comments)        Past Medical History:   Diagnosis Date     Other acne      Thyroid disease 11/17/2017     Unspecified contraceptive management     Nuva ring       Past Surgical History:   Procedure Laterality Date     ABDOMEN SURGERY  01/30/2021    Laproscopic Appendectomy     ENT SURGERY      wisdom teeth removed     EYE SURGERY  2006    Lasik     LAPAROSCOPIC APPENDECTOMY N/A 1/30/2021    Procedure: APPENDECTOMY, LAPAROSCOPIC;  Surgeon: Erum Merlos MD;  Location: [facility] OR     NO HISTORY OF SURGERY         Family History   Problem Relation Age of Onset     Other Cancer Mother         ovarian     Skin Cancer Mother      Diabetes Father      Thyroid Disease Father        Social History     Tobacco Use     Smoking status: Never Smoker     Smokeless tobacco: Never Used   Substance Use Topics     Alcohol use: Yes     Comment: occasionally        Current Outpatient Medications   Medication     calcium carbonate (OS-BRICE) 1500 (600 Ca) MG tablet     lactobacillus rhamnosus, GG, (CULTURELL) capsule     levothyroxine (SYNTHROID/LEVOTHROID) 50 MCG tablet     multivitamin w/minerals (THERA-VIT-M) tablet     No current facility-administered medications for this visit.        Review of Systems   Constitutional, HEENT, cardiovascular, pulmonary, GI, , musculoskeletal, neuro, skin, endocrine and psych systems are negative, except as otherwise noted.      Objective           Vitals:  No vitals were obtained today due to virtual visit.    Physical Exam   GENERAL: Healthy, alert and no distress  EYES: Eyes grossly normal to inspection.  No  discharge or erythema, or obvious scleral/conjunctival abnormalities.  RESP: No audible wheeze, cough, or visible cyanosis.  No visible retractions or increased work of breathing.    SKIN: Visible skin clear. No significant rash, abnormal pigmentation or lesions.  NEURO: Cranial nerves grossly intact.  Mentation and speech appropriate for age.  PSYCH: Mentation appears normal, affect normal/bright, judgement and insight intact, normal speech and appearance well-groomed.      Video-Visit Details    Video Start Time: Start: 07/06/2022 04:57 pm    Type of service:  Video Visit    Video End Time:Stop: 07/06/2022 05:12 pm    Originating Location (pt. Location): Home    Distant Location (provider location):  [facility]     Platform used for Video Visit: [employer]

## 2022-07-06 NOTE — PATIENT INSTRUCTIONS
As discussed , given your symptoms of left axillary pain which is bothering you will get the required Ultrasound and Screening mammogram and make santos its normal. Orders placed.   To take Ibuprofen as needed for improvement of the inflammation if any.     =====================      
no abdominal pain, no bloating, no constipation, no diarrhea, no nausea and no vomiting.

## 2022-10-06 ENCOUNTER — E-VISIT (OUTPATIENT)
Dept: FAMILY MEDICINE | Facility: CLINIC | Age: 42
End: 2022-10-06
Payer: COMMERCIAL

## 2022-10-06 DIAGNOSIS — J01.90 ACUTE SINUSITIS WITH SYMPTOMS > 10 DAYS: Primary | ICD-10-CM

## 2022-10-06 PROCEDURE — 99422 OL DIG E/M SVC 11-20 MIN: CPT | Performed by: INTERNAL MEDICINE

## 2022-10-06 RX ORDER — FLUTICASONE PROPIONATE 50 MCG
1 SPRAY, SUSPENSION (ML) NASAL DAILY
Qty: 18.2 ML | Refills: 0 | Status: SHIPPED | OUTPATIENT
Start: 2022-10-06 | End: 2022-12-12

## 2022-10-06 RX ORDER — FEXOFENADINE HCL 60 MG/1
60 TABLET, FILM COATED ORAL 2 TIMES DAILY
Qty: 60 TABLET | Refills: 0 | Status: SHIPPED | OUTPATIENT
Start: 2022-10-06 | End: 2023-06-29

## 2022-10-06 NOTE — TELEPHONE ENCOUNTER
Provider E-Visit time total (minutes): 11 minutes    Sent in [employer] message as below -     Wesly Roche ,     Sorry to hear that . This seems as flare up of your acute sinusitis. Sent in antibiotics, flonase, allegra and also Fluconazole antifungal as requested. Please let me know if you have any questions.     Thank you,  Savita Jerome MD on 10/6/2022 at 6:25 PM

## 2022-10-06 NOTE — PATIENT INSTRUCTIONS
You may want to try a nasal lavage (also known as nasal irrigation). You can find over-the-counter products, such as Neti-Pot, at retail locations or make your own at home. Instructions for homemade nasal lavage and more information on the process are available online at http://www.aafp.org/afp/2009/1115/p1121.html.      Sinusitis (Antibiotic Treatment)    The sinuses are air-filled spaces within the bones of the face. They connect to the inside of the nose. Sinusitis is an inflammation of the tissue that lines the sinuses. Sinusitis can occur during a cold. It can also happen due to allergies to pollens and other particles in the air. Sinusitis can cause symptoms of sinus congestion and a feeling of fullness. A sinus infection causes fever, headache, and facial pain. There is often green or yellow fluid draining from the nose or into the back of the throat (post-nasal drip). You have been given antibiotics to treat this condition.   Home care  Take the full course of antibiotics as instructed. Don't stop taking them, even when you feel better.  Drink plenty of water, hot tea, and other liquids as directed by the healthcare provider. This may help thin nasal mucus. It also may help your sinuses drain fluids.  Heat may help soothe painful areas of your face. Use a towel soaked in hot water. Or,  the shower and direct the warm spray onto your face. Using a vaporizer along with a menthol rub at night may also help soothe symptoms.   An expectorant with guaifenesin may help thin nasal mucus and help your sinuses drain fluids. Talk with your provider or pharmacists before taking an over-the-counter (OTC) medicine if you have any questions about it or its side effects..  You can use an OTC decongestant, unless a similar medicine was prescribed to you. Nasal sprays work the fastest. Use one that contains phenylephrine or oxymetazoline. First blow your nose gently. Then use the spray. Don't use these medicines  more often than directed on the label. If you do, your symptoms may get worse. You may also take pills that contain pseudoephedrine. Don t use products that combine multiple medicines. This is because side effects may be increased. Read labels. You can also ask the pharmacist for help. (People with high blood pressure should not use decongestants. They can raise blood pressure.) Talk with your provider or pharmacist if you have any questions about the medicine..  OTC antihistamines may help if allergies contributed to your sinusitis. Talk with your provider or pharmacist if you have any questions about the medicine..  Don't use nasal rinses or irrigation during an acute sinus infection, unless your healthcare provider tells you to. Rinsing may spread the infection to other areas in your sinuses.  Use acetaminophen or ibuprofen to control pain, unless another pain medicine was prescribed to you. If you have chronic liver or kidney disease or ever had a stomach ulcer, talk with your healthcare provider before using these medicines. Never give aspirin to anyone under age 18 who is ill with a fever. It may cause severe liver damage.  Don't smoke. This can make symptoms worse.    Follow-up care  Follow up with your healthcare provider, or as advised.   When to seek medical advice  Call your healthcare provider if any of these occur:   Facial pain or headache that gets worse  Stiff neck  Unusual drowsiness or confusion  Swelling of your forehead or eyelids  Symptoms don't go away in 10 days  Vision problems, such as blurred or double vision  Fever of 100.4 F (38 C) or higher, or as directed by your healthcare provider  Call 911  Call 911 if any of these occur:   Seizure  Trouble breathing  Feeling dizzy or faint  Fingernails, skin or lips look blue, purple , or gray  Prevention  Here are steps you can take to help prevent an infection:   Keep good hand washing habits.  Don t have close contact with people who have sore  throats, colds, or other upper respiratory infections.  Don t smoke, and stay away from secondhand smoke.  Stay up to date with of your vaccines.  [employer] last reviewed this educational content on 12/1/2019 2000-2021 [employer]. All rights reserved. This information is not intended as a substitute for professional medical care. Always follow your healthcare professional's instructions.        Dear Kaley Hart    After reviewing your responses, I've been able to diagnose you with?a sinus infection caused by bacteria.?     Based on your responses and diagnosis, I have prescribed  to treat your symptoms. I have sent this to your pharmacy.?     It is also important to stay well hydrated, get lots of rest and take over-the-counter decongestants,?tylenol?or ibuprofen if you?are able to?take those medications per your primary care provider to help relieve discomfort.?     It is important that you take?all of?your prescribed medication even if your symptoms are improving after a few doses.? Taking?all of?your medicine helps prevent the symptoms from returning.?     If your symptoms worsen, you develop severe headache, vomiting, high fever (>102), or are not improving in 7 days, please contact your primary care provider for an appointment or visit any of our convenient Walk-in Care or Urgent Care Centers to be seen which can be found on our website?here.?     Thanks again for choosing?us?as your health care partner,?   ?  Savita Jerome MD?

## 2022-10-07 ENCOUNTER — TELEPHONE (OUTPATIENT)
Dept: FAMILY MEDICINE | Facility: CLINIC | Age: 42
End: 2022-10-07

## 2022-10-07 NOTE — TELEPHONE ENCOUNTER
Pt was prescribed amoxicillin-clavulanate (AUGMENTIN) 875-125 MG tablet  following 10/6  e-visit. Pt notes she had  chest pain and heart palpitation and ended up at ER when she took this medication last.     Allergy list was updated. Please advice on alterative tx.    [id number] [place] [facility] [address] AT [facility] [address]    Pt will need a call back name of alternative Rx.

## 2022-10-09 NOTE — TELEPHONE ENCOUNTER
Provider E-Visit time total (minutes): 8 min.       Wesly Roche,   I am sorry , but did the ER confirm you that the chest pain was due to antibiotic ? I have given it in 3/2022 for your sinus problem and the Chest pain episode was in 6/2022. I truly dont believe that this is Augmentin allergy. ( It normally happens as soon as you take the medication. )     Please let me know if you want me to change it to something else if you are not comfortable taking this.     Thank you,  Savita Jerome MD on 10/8/2022 at 7:34 PM

## 2022-10-14 NOTE — TELEPHONE ENCOUNTER
Patient called reporting that her sinus infection symptoms are still present after finishing her antibiotics. Patient stated that she has also developed a sore throat and noticed sore at the back of her mouth. Patient was advised that there are no appointment's in the office today. Patient informed writer that she was actually not in [place] and is headed to [place]. Patient was advised that since she is not in the state of [place] at this time the provider is unable to give advise or treat due to licensing. Patient was advised to find an urgent care in [place] and to be seen there. Patient stated understanding with this plan and had no further questions.    Zelda Valentino RN  [place] [facility] Triage Team

## 2022-10-31 ENCOUNTER — VIRTUAL VISIT (OUTPATIENT)
Dept: FAMILY MEDICINE | Facility: CLINIC | Age: 42
End: 2022-10-31
Payer: COMMERCIAL

## 2022-10-31 DIAGNOSIS — R05.9 COUGH, UNSPECIFIED TYPE: Primary | ICD-10-CM

## 2022-10-31 DIAGNOSIS — Z11.52 ENCOUNTER FOR SCREENING LABORATORY TESTING FOR COVID-19 VIRUS: ICD-10-CM

## 2022-10-31 DIAGNOSIS — J06.9 VIRAL URI: ICD-10-CM

## 2022-10-31 PROCEDURE — 99213 OFFICE O/P EST LOW 20 MIN: CPT | Mod: 95 | Performed by: PHYSICIAN ASSISTANT

## 2022-10-31 ASSESSMENT — ENCOUNTER SYMPTOMS
NERVOUS/ANXIOUS: 0
FEVER: 0
CHILLS: 0
COUGH: 1
SINUS PAIN: 0
LIGHT-HEADEDNESS: 0
SINUS PRESSURE: 0
WHEEZING: 0
SORE THROAT: 0
FATIGUE: 1
SHORTNESS OF BREATH: 0

## 2022-10-31 NOTE — PROGRESS NOTES
Kaley is a 41 year old who is being evaluated via a billable video visit.      How would you like to obtain your AVS? MyChart  If the video visit is dropped, the invitation should be resent by: Text to cell phone: [phone]  Will anyone else be joining your video visit? No        Assessment & Plan     Cough, unspecified type  Viral URI  Encounter for screening laboratory testing for COVID-19 virus  Patient is a 41-year-old female who presents to telephone visit due to multiple different viral URI illnesses over the last month.  Patient notes that child is sick with similar symptoms and she recently traveled to [place] in [place].  No signs of respiratory distress test patient is able to speak in full sentences.  I suspect patient has no viral URI.  Recommended COVID-19 and further testing.  Orders placed.  Discussed treatment with rest, hydration, cough suppressant/decongestant.  Low suspicion for pneumonia as patient denies fevers, chest pain, dyspnea, productive cough.  Low suspicion for bacterial sinusitis as patient denies fevers, facial pain, dental pain.  Follow-up precautions provided.  - Influenza A/B antigen  - Symptomatic; Unknown COVID-19 Virus (Coronavirus) by PCR Nose; Future       See Patient Instructions    Return in about 1 week (around 11/7/2022), or if symptoms worsen or fail to improve.    Radha Garcia PA-C  [facility] KINDRA Roche is a 41 year old, presenting for the following health issues:  URI      History of Present Illness       Reason for visit:  Persistent cough, sinus pressure, exhaustion, sore throat.    She eats 2-3 servings of fruits and vegetables daily.She consumes 0 sweetened beverage(s) daily.She exercises with enough effort to increase her heart rate 9 or less minutes per day.  She exercises with enough effort to increase her heart rate 3 or less days per week.   She is taking medications regularly.     Patient notes symptoms started 10/2  thinking symptoms were related to sinusitis. She was treated with Augmentin and nasal spray.  She completed antibiotics and symptoms were improving. Symptoms worsened and patient went to  twice as below. Patient notes blisters are peeling and drying. Patient notes symptoms were improving and now are worsening.  She has ongoing nasal congestion and drainage, cough, and ear congestion. She has been using Tylenol and Advil for symptom management. Patient tested for COVID19 X 2 and was negative. Recent travel to [place] in [place].  Child in  has similar symptoms.    Per chart review, patient treated initially for possible strep throat 10/14/22 with PEN VK. Testing negative. Patient then developed rash and was treated for hand foot and mouth with Medrol Dospak.     Review of Systems   Constitutional: Positive for fatigue. Negative for chills and fever.   HENT: Positive for congestion and ear pain. Negative for sinus pressure, sinus pain and sore throat.    Respiratory: Positive for cough (dry ). Negative for shortness of breath and wheezing.    Cardiovascular: Negative for chest pain.   Skin: Negative for rash.   Neurological: Negative for light-headedness.   Psychiatric/Behavioral: The patient is not nervous/anxious.             Objective           Vitals:  No vitals were obtained today due to virtual visit.    Physical Exam   GENERAL: Healthy, alert and no distress  RESP: No audible wheeze. Dry cough. No visible retractions or increased work of breathing.    NEURO: Cranial nerves grossly intact.  Mentation and speech appropriate for age.  PSYCH: Mentation appears normal, affect normal/bright, judgement and insight intact, normal speech.            Video-Visit Details    Video Start Time: 2:14 PM    Type of service:  Video Visit    Video End Time:2:32 PM    Originating Location (pt. Location): Home    Distant Location (provider location):  On-site    Platform used for Video Visit: Unable to complete video visit

## 2022-10-31 NOTE — PATIENT INSTRUCTIONS
Wesly Roche,    Your symptoms are concerning for Influenza, COVID-19, or other viral illness.  A COVID-19 and Flu test have been ordered for you.  You will be contacted within 24 hours to schedule your test.  After completing the test, results should be available within 1-2 days and will be sent to your MyChart.  You may use Tylenol for fever and pain management and benzonatate/Robitussin-DM/Mucinex DM for cough and congestion.  Make sure to get plenty of rest and stay hydrated.      If you have severe symptoms such as chest pain, wheezing, coughing up blood, shortness of breath, or fevers that you cannot control, please go to the emergency department.    Please reach out with any questions or concerns.  Take care,  Radha Garcia PA-C

## 2022-11-02 ENCOUNTER — LAB (OUTPATIENT)
Dept: URGENT CARE | Facility: URGENT CARE | Age: 42
End: 2022-11-02
Attending: PHYSICIAN ASSISTANT
Payer: COMMERCIAL

## 2022-11-02 DIAGNOSIS — Z11.52 ENCOUNTER FOR SCREENING LABORATORY TESTING FOR COVID-19 VIRUS: ICD-10-CM

## 2022-11-02 LAB
FLUAV AG SPEC QL IA: NEGATIVE
FLUBV AG SPEC QL IA: NEGATIVE
SARS-COV-2 RNA RESP QL NAA+PROBE: NEGATIVE

## 2022-11-02 PROCEDURE — U0003 INFECTIOUS AGENT DETECTION BY NUCLEIC ACID (DNA OR RNA); SEVERE ACUTE RESPIRATORY SYNDROME CORONAVIRUS 2 (SARS-COV-2) (CORONAVIRUS DISEASE [COVID-19]), AMPLIFIED PROBE TECHNIQUE, MAKING USE OF HIGH THROUGHPUT TECHNOLOGIES AS DESCRIBED BY CMS-2020-01-R: HCPCS

## 2022-11-02 PROCEDURE — U0005 INFEC AGEN DETEC AMPLI PROBE: HCPCS

## 2022-11-02 PROCEDURE — 87804 INFLUENZA ASSAY W/OPTIC: CPT | Performed by: PHYSICIAN ASSISTANT

## 2022-11-17 ENCOUNTER — OFFICE VISIT (OUTPATIENT)
Dept: OTOLARYNGOLOGY | Facility: CLINIC | Age: 42
End: 2022-11-17
Payer: COMMERCIAL

## 2022-11-17 DIAGNOSIS — J06.9 VIRAL URI: Primary | ICD-10-CM

## 2022-11-17 PROCEDURE — 99203 OFFICE O/P NEW LOW 30 MIN: CPT | Performed by: OTOLARYNGOLOGY

## 2022-11-17 NOTE — PROGRESS NOTES
HPI: This patient is a 42yo F who presents for evaluation of the throat. She has been getting recurrently ill over the past few months. Started with a cold, then hand/foot/mouth disease, now another bout of a URI. She has not really been a person who is sick often prior to this. Denies fevers, otalgia, weight loss, odynophagia, dysphagia, hemoptysis, and shortness of breath.     Past medical history, surgical history, social history, family history, medications, and allergies have been reviewed with the patient and are documented above.    Review of Systems: a 10-system review was performed. Pertinent positives are noted in the HPI and on a separate scanned document in the chart.    PHYSICAL EXAMINATION:  GEN: no acute distress, normocephalic  EYES: extraocular movements are intact, pupils are equal and round. Sclera clear.   EARS: auricles are normally formed. The external auditory canals are clear with minimal to no cerumen. Tympanic membranes are intact bilaterally with no signs of infection, effusion, retractions, or perforations.  NOSE: anterior nares are patent. There are no masses or lesions. The septum is non-obstructing. No tenderness over the sinuses to percussion.  OC/OP: clear, dentition is in good repair. The tongue and palate are fully mobile and symmetric. No masses or lesions. Tonsils symmetric and without exudate or erythema.   NECK: soft and supple. No lymphadenopathy or masses. Airway is midline.  NEURO: CN VII and XII symmetric. alert and oriented. No spontaneous nystagmus. Gait is normal.  PULM: breathing comfortably on room air, normal chest expansion with respiration  CARDS: no cyanosis or clubbing, normal carotid pulses    MEDICAL DECISION-MAKING: This patient is a 42yo F with back to back URIs of different flavors over the past few months. No ENT or anatomic issue. Discussed immune bolstering strategies, sleep, hydration, masking. She is welcome to return for reeval anytime.

## 2022-11-17 NOTE — LETTER
11/17/2022         RE: Kaley Hart  [address]  [place] [address]        Dear Colleague,    Thank you for referring your patient, Kaley Hart, to the [facility]. Please see a copy of my visit note below.    HPI: This patient is a 42yo F who presents for evaluation of the throat. She has been getting recurrently ill over the past few months. Started with a cold, then hand/foot/mouth disease, now another bout of a URI. She has not really been a person who is sick often prior to this. Denies fevers, otalgia, weight loss, odynophagia, dysphagia, hemoptysis, and shortness of breath.     Past medical history, surgical history, social history, family history, medications, and allergies have been reviewed with the patient and are documented above.    Review of Systems: a 10-system review was performed. Pertinent positives are noted in the HPI and on a separate scanned document in the chart.    PHYSICAL EXAMINATION:  GEN: no acute distress, normocephalic  EYES: extraocular movements are intact, pupils are equal and round. Sclera clear.   EARS: auricles are normally formed. The external auditory canals are clear with minimal to no cerumen. Tympanic membranes are intact bilaterally with no signs of infection, effusion, retractions, or perforations.  NOSE: anterior nares are patent. There are no masses or lesions. The septum is non-obstructing. No tenderness over the sinuses to percussion.  OC/OP: clear, dentition is in good repair. The tongue and palate are fully mobile and symmetric. No masses or lesions. Tonsils symmetric and without exudate or erythema.   NECK: soft and supple. No lymphadenopathy or masses. Airway is midline.  NEURO: CN VII and XII symmetric. alert and oriented. No spontaneous nystagmus. Gait is normal.  PULM: breathing comfortably on room air, normal chest expansion with respiration  CARDS: no cyanosis or clubbing, normal carotid pulses    MEDICAL  DECISION-MAKING: This patient is a 40yo F with back to back URIs of different flavors over the past few months. No ENT or anatomic issue. Discussed immune bolstering strategies, sleep, hydration, masking. She is welcome to return for reeval anytime.         Again, thank you for allowing me to participate in the care of your patient.        Sincerely,        Xochilt Hardy MD

## 2022-12-10 DIAGNOSIS — J01.90 ACUTE SINUSITIS WITH SYMPTOMS > 10 DAYS: ICD-10-CM

## 2022-12-12 RX ORDER — FLUTICASONE PROPIONATE 50 MCG
SPRAY, SUSPENSION (ML) NASAL
Qty: 16 G | Refills: 1 | Status: SHIPPED | OUTPATIENT
Start: 2022-12-12 | End: 2023-06-29

## 2022-12-12 NOTE — TELEPHONE ENCOUNTER
Prescription approved per [facility].  Anna Diaz RN  [facility]

## 2022-12-26 ENCOUNTER — HEALTH MAINTENANCE LETTER (OUTPATIENT)
Age: 42
End: 2022-12-26

## 2023-01-20 ENCOUNTER — ANCILLARY PROCEDURE (OUTPATIENT)
Dept: GENERAL RADIOLOGY | Facility: CLINIC | Age: 43
End: 2023-01-20
Attending: FAMILY MEDICINE
Payer: COMMERCIAL

## 2023-01-20 ENCOUNTER — OFFICE VISIT (OUTPATIENT)
Dept: URGENT CARE | Facility: URGENT CARE | Age: 43
End: 2023-01-20
Payer: COMMERCIAL

## 2023-01-20 ENCOUNTER — NURSE TRIAGE (OUTPATIENT)
Dept: NURSING | Facility: CLINIC | Age: 43
End: 2023-01-20
Payer: COMMERCIAL

## 2023-01-20 VITALS
DIASTOLIC BLOOD PRESSURE: 74 MMHG | WEIGHT: 140 LBS | TEMPERATURE: 98 F | SYSTOLIC BLOOD PRESSURE: 109 MMHG | HEART RATE: 51 BPM | RESPIRATION RATE: 18 BRPM | OXYGEN SATURATION: 100 % | BODY MASS INDEX: 20.98 KG/M2

## 2023-01-20 DIAGNOSIS — U07.1 INFECTION DUE TO 2019 NOVEL CORONAVIRUS: Primary | ICD-10-CM

## 2023-01-20 DIAGNOSIS — R06.2 WHEEZE: ICD-10-CM

## 2023-01-20 PROCEDURE — 71046 X-RAY EXAM CHEST 2 VIEWS: CPT | Mod: TC | Performed by: RADIOLOGY

## 2023-01-20 PROCEDURE — 99213 OFFICE O/P EST LOW 20 MIN: CPT | Mod: CS | Performed by: FAMILY MEDICINE

## 2023-01-20 RX ORDER — ALBUTEROL SULFATE 90 UG/1
2 AEROSOL, METERED RESPIRATORY (INHALATION) EVERY 6 HOURS
Qty: 18 G | Refills: 0 | Status: SHIPPED | OUTPATIENT
Start: 2023-01-20 | End: 2023-11-08

## 2023-01-20 NOTE — PROGRESS NOTES
SUBJECTIVE: Kaley Hart is a 42 year old female presenting with a chief complaint of cough  and wheeze.  Onset of symptoms was day(s) ago.    Predisposing factors include ill contact: covid recently.    Past Medical History:   Diagnosis Date     Other acne      Thyroid disease 11/17/2017     Unspecified contraceptive management     Nuva ring     Allergies   Allergen Reactions     Codeine Anaphylaxis and Other (See Comments)     Molds & Smuts Other (See Comments), Anaphylaxis and Shortness Of Breath     Other reaction(s): Abnormal breathing     Cholesterol Support Other (See Comments)     Mold Difficulty breathing and Other (See Comments)     Seasonal Allergies Other (See Comments)     Augmentin Palpitations     Chest pain and palpitations     Social History     Tobacco Use     Smoking status: Never     Smokeless tobacco: Never   Substance Use Topics     Alcohol use: Yes     Comment: occasionally       ROS:  SKIN: no rash  GI: no vomiting    OBJECTIVE:  /74   Pulse 51   Temp 98  F (36.7  C)   Resp 18   Wt 63.5 kg (140 lb)   SpO2 100%   BMI 20.98 kg/m  GENERAL APPEARANCE: healthy, alert and no distress  EYES: EOMI,  PERRL, conjunctiva clear  HENT: ear canals and TM's normal.  Nose and mouth without ulcers, erythema or lesions  RESP: lungs clear to auscultation - no rales, rhonchi or wheezes  SKIN: no suspicious lesions or rashes    Xray without acute findings, no pneumnoiaread by Lei Cam D.O.      ICD-10-CM    1. Infection due to 2019 novel coronavirus  U07.1 XR Chest 2 Views     albuterol (PROAIR HFA) 108 (90 Base) MCG/ACT inhaler      2. Wheeze  R06.2 XR Chest 2 Views     albuterol (PROAIR HFA) 108 (90 Base) MCG/ACT inhaler          Fluids/Rest, f/u if worse/not any better

## 2023-01-20 NOTE — TELEPHONE ENCOUNTER
She is wheezing - per her Ob/Gyn today.     Tested Positive for 1/5/23 and symptoms started 1/3/23    The cough has persisted     Denies any chest pain    Some shortness of breath when walking around    Advised the patient that she should be seen today either in the clinic or urgent care.    Patient declined urgent care and would prefer clinic visit. Patient given symptoms to watch for and if she is worsening she should be seen sooner.    Home cares given per protocol.    The patient indicates understanding of these issues and agrees with the plan.    Hoa Doe RN  [place] Nurse Advisor  12:36 PM  1/20/2023        Reason for Disposition    MILD difficulty breathing (e.g., minimal/no SOB at rest, SOB with walking, pulse <100) and still present when not coughing    Additional Information    Negative: Bluish (or gray) lips or face    Negative: SEVERE difficulty breathing (e.g., struggling for each breath, speaks in single words)    Negative: Rapid onset of cough and has hives    Negative: Coughing started suddenly after medicine, an allergic food or bee sting    Negative: Difficulty breathing after exposure to flames, smoke, or fumes    Negative: Sounds like a life-threatening emergency to the triager    Negative: MODERATE difficulty breathing (e.g., speaks in phrases, SOB even at rest, pulse 100-120) and still present when not coughing    Negative: Chest pain present when not coughing    Negative: Passed out (i.e., fainted, collapsed and was not responding)    Negative: Patient sounds very sick or weak to the triager    Protocols used: COUGH-A-OH

## 2023-03-28 ENCOUNTER — TELEPHONE (OUTPATIENT)
Dept: FAMILY MEDICINE | Facility: CLINIC | Age: 43
End: 2023-03-28

## 2023-03-28 NOTE — TELEPHONE ENCOUNTER
Patient Quality Outreach    Patient is due for the following:   Depression  -  PHQ-9 needed    Next Steps:   Patient was assigned appropriate questionnaire to complete    Type of outreach:    Sent [employer] message.    Pt has Physical on 06/29/23    Questions for provider review:    None     Amisha Sánchez MA  Chart routed to Care Team.

## 2023-04-22 ENCOUNTER — HEALTH MAINTENANCE LETTER (OUTPATIENT)
Age: 43
End: 2023-04-22

## 2023-06-15 ENCOUNTER — DOCUMENTATION ONLY (OUTPATIENT)
Dept: LAB | Facility: CLINIC | Age: 43
End: 2023-06-15
Payer: COMMERCIAL

## 2023-06-15 NOTE — PROGRESS NOTES
Kaley Hart has an upcoming lab appointment:    Future Appointments   Date Time Provider Department Center   6/26/2023  1:15 PM [facility]   6/29/2023  8:30 AM [facility]     Patient is scheduled for the following lab(s): thryoid testing    There is no order available. Please review and place either future orders or HMPO (Review of Health Maintenance Protocol Orders), as appropriate.    Health Maintenance Due   Topic     TSH W/FREE T4 REFLEX      ANNUAL REVIEW OF HM ORDERS      Yolis Swenson

## 2023-06-16 NOTE — PROGRESS NOTES
Please let the pt know that I can place all the labs in her Annual physical coming up. OK to cancel the lab only appointment.     Thank you,  Savita Jerome MD on 6/16/2023 at 8:26 AM

## 2023-06-29 ENCOUNTER — OFFICE VISIT (OUTPATIENT)
Dept: FAMILY MEDICINE | Facility: CLINIC | Age: 43
End: 2023-06-29
Payer: COMMERCIAL

## 2023-06-29 VITALS
RESPIRATION RATE: 16 BRPM | SYSTOLIC BLOOD PRESSURE: 104 MMHG | TEMPERATURE: 97.5 F | HEART RATE: 64 BPM | DIASTOLIC BLOOD PRESSURE: 68 MMHG | OXYGEN SATURATION: 97 % | BODY MASS INDEX: 21.49 KG/M2 | WEIGHT: 141.8 LBS | HEIGHT: 68 IN

## 2023-06-29 DIAGNOSIS — Z83.3 FAMILY HISTORY OF DIABETES MELLITUS: ICD-10-CM

## 2023-06-29 DIAGNOSIS — E67.3 HYPERVITAMINOSIS D: ICD-10-CM

## 2023-06-29 DIAGNOSIS — Z12.31 VISIT FOR SCREENING MAMMOGRAM: ICD-10-CM

## 2023-06-29 DIAGNOSIS — L29.9 PRURITIC DISORDER: ICD-10-CM

## 2023-06-29 DIAGNOSIS — R21 RASH OF FACE: ICD-10-CM

## 2023-06-29 DIAGNOSIS — M67.90 TENDINOPATHY, GENERALIZED: ICD-10-CM

## 2023-06-29 DIAGNOSIS — B07.9 VIRAL WART ON LEFT THUMB: ICD-10-CM

## 2023-06-29 DIAGNOSIS — E78.5 DYSLIPIDEMIA: ICD-10-CM

## 2023-06-29 DIAGNOSIS — Z00.00 ROUTINE GENERAL MEDICAL EXAMINATION AT A HEALTH CARE FACILITY: Primary | ICD-10-CM

## 2023-06-29 DIAGNOSIS — Z23 HIGH PRIORITY FOR 2019-NCOV VACCINE: ICD-10-CM

## 2023-06-29 DIAGNOSIS — J32.9 RECURRENT SINUSITIS: ICD-10-CM

## 2023-06-29 DIAGNOSIS — R76.8 ANA POSITIVE: ICD-10-CM

## 2023-06-29 DIAGNOSIS — M21.611 BUNION OF GREAT TOE OF RIGHT FOOT: ICD-10-CM

## 2023-06-29 DIAGNOSIS — E06.3 HASHIMOTO'S THYROIDITIS: ICD-10-CM

## 2023-06-29 DIAGNOSIS — M25.50 MULTIPLE JOINT PAIN: ICD-10-CM

## 2023-06-29 LAB
ALBUMIN SERPL BCG-MCNC: 4.4 G/DL (ref 3.5–5.2)
ALP SERPL-CCNC: 55 U/L (ref 35–104)
ALT SERPL W P-5'-P-CCNC: 8 U/L (ref 0–50)
ANION GAP SERPL CALCULATED.3IONS-SCNC: 11 MMOL/L (ref 7–15)
AST SERPL W P-5'-P-CCNC: 20 U/L (ref 0–45)
BILIRUB SERPL-MCNC: 0.5 MG/DL
BUN SERPL-MCNC: 13.8 MG/DL (ref 6–20)
CALCIUM SERPL-MCNC: 9.4 MG/DL (ref 8.6–10)
CHLORIDE SERPL-SCNC: 108 MMOL/L (ref 98–107)
CHOLEST SERPL-MCNC: 166 MG/DL
CREAT SERPL-MCNC: 0.93 MG/DL (ref 0.51–0.95)
DEPRECATED HCO3 PLAS-SCNC: 24 MMOL/L (ref 22–29)
ERYTHROCYTE [DISTWIDTH] IN BLOOD BY AUTOMATED COUNT: 11.9 % (ref 10–15)
GFR SERPL CREATININE-BSD FRML MDRD: 79 ML/MIN/1.73M2
GLUCOSE SERPL-MCNC: 79 MG/DL (ref 70–99)
HBA1C MFR BLD: 5.1 % (ref 0–5.6)
HCT VFR BLD AUTO: 40.2 % (ref 35–47)
HDLC SERPL-MCNC: 56 MG/DL
HGB BLD-MCNC: 13.8 G/DL (ref 11.7–15.7)
LDLC SERPL CALC-MCNC: 99 MG/DL
MAGNESIUM SERPL-MCNC: 2.1 MG/DL (ref 1.7–2.3)
MCH RBC QN AUTO: 31.4 PG (ref 26.5–33)
MCHC RBC AUTO-ENTMCNC: 34.3 G/DL (ref 31.5–36.5)
MCV RBC AUTO: 91 FL (ref 78–100)
NONHDLC SERPL-MCNC: 110 MG/DL
PLATELET # BLD AUTO: 182 10E3/UL (ref 150–450)
POTASSIUM SERPL-SCNC: 4.4 MMOL/L (ref 3.4–5.3)
PROT SERPL-MCNC: 7.1 G/DL (ref 6.4–8.3)
RBC # BLD AUTO: 4.4 10E6/UL (ref 3.8–5.2)
SODIUM SERPL-SCNC: 143 MMOL/L (ref 136–145)
TRIGL SERPL-MCNC: 55 MG/DL
TSH SERPL DL<=0.005 MIU/L-ACNC: 2.09 UIU/ML (ref 0.3–4.2)
VIT B12 SERPL-MCNC: 493 PG/ML (ref 232–1245)
WBC # BLD AUTO: 4.6 10E3/UL (ref 4–11)

## 2023-06-29 PROCEDURE — 83735 ASSAY OF MAGNESIUM: CPT | Performed by: INTERNAL MEDICINE

## 2023-06-29 PROCEDURE — 83036 HEMOGLOBIN GLYCOSYLATED A1C: CPT | Performed by: INTERNAL MEDICINE

## 2023-06-29 PROCEDURE — 99396 PREV VISIT EST AGE 40-64: CPT | Mod: 25 | Performed by: INTERNAL MEDICINE

## 2023-06-29 PROCEDURE — 82306 VITAMIN D 25 HYDROXY: CPT | Performed by: INTERNAL MEDICINE

## 2023-06-29 PROCEDURE — 99215 OFFICE O/P EST HI 40 MIN: CPT | Mod: 25 | Performed by: INTERNAL MEDICINE

## 2023-06-29 PROCEDURE — 91312 COVID-19 BIVALENT 12+ (PFIZER): CPT | Performed by: INTERNAL MEDICINE

## 2023-06-29 PROCEDURE — 85027 COMPLETE CBC AUTOMATED: CPT | Performed by: INTERNAL MEDICINE

## 2023-06-29 PROCEDURE — 36415 COLL VENOUS BLD VENIPUNCTURE: CPT | Performed by: INTERNAL MEDICINE

## 2023-06-29 PROCEDURE — 84443 ASSAY THYROID STIM HORMONE: CPT | Performed by: INTERNAL MEDICINE

## 2023-06-29 PROCEDURE — 0121A COVID-19 BIVALENT 12+ (PFIZER): CPT | Performed by: INTERNAL MEDICINE

## 2023-06-29 PROCEDURE — 82607 VITAMIN B-12: CPT | Performed by: INTERNAL MEDICINE

## 2023-06-29 PROCEDURE — 80053 COMPREHEN METABOLIC PANEL: CPT | Performed by: INTERNAL MEDICINE

## 2023-06-29 PROCEDURE — 17110 DESTRUCTION B9 LES UP TO 14: CPT | Performed by: INTERNAL MEDICINE

## 2023-06-29 PROCEDURE — 80061 LIPID PANEL: CPT | Performed by: INTERNAL MEDICINE

## 2023-06-29 RX ORDER — FEXOFENADINE HCL 60 MG/1
60 TABLET, FILM COATED ORAL 2 TIMES DAILY
Qty: 60 TABLET | Refills: 1 | Status: SHIPPED | OUTPATIENT
Start: 2023-06-29 | End: 2023-11-08

## 2023-06-29 RX ORDER — CLOBETASOL PROPIONATE 0.05 MG/G
GEL TOPICAL
Qty: 30 G | Refills: 1 | Status: SHIPPED | OUTPATIENT
Start: 2023-06-29 | End: 2023-10-09

## 2023-06-29 ASSESSMENT — PAIN SCALES - GENERAL: PAINLEVEL: NO PAIN (0)

## 2023-06-29 ASSESSMENT — ENCOUNTER SYMPTOMS
SORE THROAT: 0
ABDOMINAL PAIN: 0
BREAST MASS: 0
JOINT SWELLING: 0
HEADACHES: 0
WEAKNESS: 0
COUGH: 0
EYE PAIN: 0
DIZZINESS: 0
FREQUENCY: 0
FEVER: 0
MYALGIAS: 0
DIARRHEA: 0
SHORTNESS OF BREATH: 0
DYSURIA: 0
NAUSEA: 0
HEMATOCHEZIA: 0
NERVOUS/ANXIOUS: 0
CONSTIPATION: 0
CHILLS: 0
HEARTBURN: 0
ARTHRALGIAS: 1
HEMATURIA: 0
PALPITATIONS: 0
PARESTHESIAS: 1

## 2023-06-29 NOTE — PROGRESS NOTES
SUBJECTIVE:   CC: Kaley is an 42 year old who presents for preventive health visit.       2023     8:27 AM   Additional Questions   Roomed by Surjit   Accompanied by N/A     Healthy Habits:     Getting at least 3 servings of Calcium per day:  Yes    Bi-annual eye exam:  Yes    Dental care twice a year:  Yes    Sleep apnea or symptoms of sleep apnea:  None    Diet:  Regular (no restrictions)    Frequency of exercise:  2-3 days/week    Duration of exercise:  30-45 minutes    Taking medications regularly:  Yes    Medication side effects:  None    PHQ-2 Total Score: 0    Additional concerns today:  Yes    Today's PHQ-2 Score:       2023     8:23 AM   PHQ-2 (  [employer])   Q1: Little interest or pleasure in doing things 0   Q2: Feeling down, depressed or hopeless 0   PHQ-2 Score 0   Q1: Little interest or pleasure in doing things Not at all   Q2: Feeling down, depressed or hopeless Not at all   PHQ-2 Score 0     Social History     Tobacco Use     Smoking status: Never     Smokeless tobacco: Never   Substance Use Topics     Alcohol use: Yes     Comment: occasionally             2023     8:23 AM   Alcohol Use   Prescreen: >3 drinks/day or >7 drinks/week? No          No data to display              Reviewed orders with patient.  Reviewed health maintenance and updated orders accordingly - Yes  Lab work is in process   Labs reviewed in EPIC    Breast Cancer Screenin/25/2022     3:26 PM   Breast CA Risk Assessment (FHS-7)   Do you have a family history of breast, colon, or ovarian cancer? No / Unknown       click delete button to remove this line now  Mammogram Screening - Offered annual screening and updated Health Maintenance for mutual plan based on risk factor consideration    Pertinent mammograms are reviewed under the imaging tab.    History of abnormal Pap smear: NO - age 30-65 PAP every 5 years with negative HPV co-testing recommended     Reviewed and updated as needed this visit by  clinical staff   Tobacco  Allergies  Meds  Problems  Med Hx  Surg Hx  Fam Hx          Reviewed and updated as needed this visit by Provider   Tobacco  Allergies  Meds  Problems  Med Hx  Surg Hx  Fam Hx         Past Medical History:   Diagnosis Date     Other acne      Thyroid disease 2017     Unspecified contraceptive management     Nuva ring      Past Surgical History:   Procedure Laterality Date     ABDOMEN SURGERY  2021    Laproscopic Appendectomy     ENT SURGERY      wisdom teeth removed     EYE SURGERY  2006    Lasik     LAPAROSCOPIC APPENDECTOMY N/A 2021    Procedure: APPENDECTOMY, LAPAROSCOPIC;  Surgeon: Erum Merlos MD;  Location: [facility] OR     NO HISTORY OF SURGERY       OB History    Para Term  AB Living   0 0 0 0 0 0   SAB IAB Ectopic Multiple Live Births   0 0 0 0 0       Review of Systems   Constitutional: Negative for chills and fever.   HENT: Negative for congestion, ear pain, hearing loss and sore throat.    Eyes: Negative for pain and visual disturbance.   Respiratory: Negative for cough and shortness of breath.    Cardiovascular: Negative for chest pain, palpitations and peripheral edema.   Gastrointestinal: Negative for abdominal pain, constipation, diarrhea, heartburn, hematochezia and nausea.   Breasts:  Negative for tenderness, breast mass and discharge.   Genitourinary: Negative for dysuria, frequency, genital sores, hematuria, pelvic pain, urgency, vaginal bleeding and vaginal discharge.   Musculoskeletal: Positive for arthralgias. Negative for joint swelling and myalgias.   Skin: Negative for rash.   Neurological: Positive for paresthesias. Negative for dizziness, weakness and headaches.   Psychiatric/Behavioral: Negative for mood changes. The patient is not nervous/anxious.      CONSTITUTIONAL: NEGATIVE for fever, chills, change in weight  INTEGUMENTARU/SKIN: NEGATIVE for worrisome rashes, moles or lesions  EYES: NEGATIVE for vision changes or  "irritation  ENT: NEGATIVE for ear, mouth and throat problems  RESP: NEGATIVE for significant cough or SOB  BREAST: NEGATIVE for masses, tenderness or discharge  CV: NEGATIVE for chest pain, palpitations or peripheral edema  GI: NEGATIVE for nausea, abdominal pain, heartburn, or change in bowel habits  : NEGATIVE for unusual urinary or vaginal symptoms. Periods are regular.  MUSCULOSKELETAL: NEGATIVE for significant arthralgias or myalgia  NEURO: NEGATIVE for weakness, dizziness or paresthesias  PSYCHIATRIC: NEGATIVE for changes in mood or affect     OBJECTIVE:   /68   Pulse 64   Temp 97.5  F (36.4  C) (Tympanic)   Resp 16   Ht 1.735 m (5' 8.31\")   Wt 64.3 kg (141 lb 12.8 oz)   LMP 06/27/2023   SpO2 97%   BMI 21.37 kg/m    Physical Exam  GENERAL: healthy, alert and no distress  EYES: Eyes grossly normal to inspection, PERRL and conjunctivae and sclerae normal  HENT: ear canals and TM's normal, nose and mouth without ulcers or lesions  NECK: no adenopathy, no asymmetry, masses, or scars and thyroid normal to palpation  RESP: lungs clear to auscultation - no rales, rhonchi or wheezes  BREAST: normal without masses, tenderness or nipple discharge and no palpable axillary masses or adenopathy  CV: regular rate and rhythm, normal S1 S2, no S3 or S4, no murmur, click or rub, no peripheral edema and peripheral pulses strong  ABDOMEN: soft, nontender, no hepatosplenomegaly, no masses and bowel sounds normal  MS: no gross musculoskeletal defects noted, no edema  SKIN: no suspicious lesions or rashes  NEURO: Normal strength and tone, mentation intact and speech normal  PSYCH: mentation appears normal, affect normal/bright    Diagnostic Test Results:  Labs reviewed in Epic    ASSESSMENT/PLAN:     Assessment and Plan  1. Routine general medical examination at a health care facility  Last seen pt on 2/2022 for Annual physical and also E-visits fo rrecurrent sinusitis in the interim. Pt is here for Annual physical " today. Does have HYpothyroidism on LT 50 mcg daily. Last lab work in 6/2022 showing normal BMP , high Vit.D , dyslipidemia with LDL at 130 and CBC  - TSH WITH FREE T4 REFLEX; Future  - REVIEW OF HEALTH MAINTENANCE PROTOCOL ORDERS  - Lipid panel reflex to direct LDL Fasting; Future  - Comprehensive metabolic panel (BMP + Alb, Alk Phos, ALT, AST, Total. Bili, TP); Future  - CBC with platelets; Future  - Vitamin D deficiency screening; Future  - Vitamin B12; Future  - Hemoglobin A1c; Future  - TSH WITH FREE T4 REFLEX  - Lipid panel reflex to direct LDL Fasting  - Comprehensive metabolic panel (BMP + Alb, Alk Phos, ALT, AST, Total. Bili, TP)  - CBC with platelets  - Vitamin D deficiency screening  - Vitamin B12  - Hemoglobin A1c    2. Hashimoto's thyroiditis  - TSH WITH FREE T4 REFLEX; Future  - TSH WITH FREE T4 REFLEX    3. Recurrent sinusitis  Ongoing problem, with recurrences. Has seen ENT which states no masses or lesions visible. Discussed on pt to get a sinus wash if possible with follow up again. Discussed on need for biologic agents scheduled for Q monthly if she can discuss with the Allergy specialist to avoid the recurrent skin and respiratory allergies which she understood opted for a second opinion of Allergy specialist. Placed the referral.   - Adult Allergy/Asthma Referral; Future    4. Dyslipidemia  - Lipid panel reflex to direct LDL Fasting; Future  - Lipid panel reflex to direct LDL Fasting    5. Viral wart on left thumb  New problem , on the left thumb.  Cryotherapy done in the office today after informed consent.  After visit instructions  - DESTRUCT BENIGN LESION, UP TO 14    6. Tendinopathy, generalized  New problem, unspecified tendon pain pain on the little finger, toes, hand dorsal aspect which appears to the physical stress and strain at work most likely.  We will check for metabolic causes before further steps on this.  Patient understood and agreed with the plan.  Discussed on home physical  therapy is explained to her which she will follow.  - Comprehensive metabolic panel (BMP + Alb, Alk Phos, ALT, AST, Total. Bili, TP); Future  - CBC with platelets; Future  - Vitamin B12; Future  - Hemoglobin A1c; Future  - Magnesium; Future  - Comprehensive metabolic panel (BMP + Alb, Alk Phos, ALT, AST, Total. Bili, TP)  - CBC with platelets  - Vitamin B12  - Hemoglobin A1c  - Magnesium    7. Family history of diabetes mellitus  - Hemoglobin A1c; Future  - Hemoglobin A1c    8. Rash of face  New problem with mild eczematous rash seen on the right cheek.  We will give a short course of thin layered clobetasol for improvement.  - clobetasol (TEMOVATE) 0.05 % GEL topical gel; Apply a thin layer on the Rt cheek twice daily for 1 week and stop.  Dispense: 30 g; Refill: 1  - Adult Dermatology Referral; Future  - Adult Allergy/Asthma Referral; Future    9. Pruritic disorder  New problem, patient has nonspecific allergies with ongoing pruritus in bilateral lower extremities and upper extremities on the palms and soles she mentions.  I do not see any visible lesions possibly inherent allergic reactions to some unspecified triggers.  Discussed on using antihistamine, given the skin issues as above we will also get recommendations from dermatology.  To keep up the appointment with allergy specialist.  - Adult Dermatology Referral; Future  - fexofenadine (ALLEGRA) 60 MG tablet; Take 1 tablet (60 mg) by mouth 2 times daily  Dispense: 60 tablet; Refill: 1  - Adult Allergy/Asthma Referral; Future    10. Bunion of great toe of right foot  New problem, emphasized on the preventive methods of changing the footwear as still it is in early stages.  It is not completely formed but will need to prevent this which patient understood and agreed.    11. Hypervitaminosis D  - Vitamin D deficiency screening; Future  - Vitamin D deficiency screening    12. High priority for 2019-nCoV vaccine  - COVID-19 BIVALENT 12+ (PFIZER)     UPDATE -      14. YING positive  15. Multiple joint pain  New problem of YING positive seen on connective tissue panel results. Will place referral to rheumatology for further recommendations.   - Adult Rheumatology  Referral; Future     Over 40 minutes spent outside the preventive visit on reviewing patient chart,  face to face encounter, greater than 50% time spent with plan/cordination of care and documentation as above in my A/P.           Please note that this note consists of symbols derived from keyboarding, dictation and/or voice recognition software. As a result, there may be errors in the script that have gone undetected. Please consider this when interpreting information found in this chart.    Patient Instructions   As discussed, please do fasting labs placed.     Placed referral to dermatology and Allergy specialist.     Please make a follow up up ENT when there is NO acute flare up so that they can do a sinus wash if needed to avoid recurrences of your chronic sinusitis    Sent in Antihistamine, Clobetasol gel for facial rash.     Will do all the work up for your generalized tendinopathies . Please follow home therapies discussed.   =============================          Preventive Health Recommendations  Female Ages 40 to 49    Yearly exam:   See your health care provider every year in order to  Review health changes.   Discuss preventive care.    Review your medicines if your doctor prescribed any.    Get a Pap test every three years (unless you have an abnormal result and your provider advises testing more often).    If you get Pap tests with HPV test, you only need to test every 5 years, unless you have an abnormal result. You do not need a Pap test if your uterus was removed (hysterectomy) and you have not had cancer.    You should be tested each year for STDs (sexually transmitted diseases), if you're at risk.   Ask your doctor if you should have a mammogram.    Have a colonoscopy (test for colon  cancer) if someone in your family has had colon cancer or polyps before age 50.     Have a cholesterol test every 5 years.     Have a diabetes test (fasting glucose) after age 45. If you are at risk for diabetes, you should have this test every 3 years.    Shots: Get a flu shot each year. Get a tetanus shot every 10 years.     Nutrition:   Eat at least 5 servings of fruits and vegetables each day.  Eat whole-grain bread, whole-wheat pasta and brown rice instead of white grains and rice.  Get adequate Calcium and Vitamin D.      Lifestyle  Exercise at least 150 minutes a week (an average of 30 minutes a day, 5 days a week). This will help you control your weight and prevent disease.  Limit alcohol to one drink per day.  No smoking.   Wear sunscreen to prevent skin cancer.  See your dentist every six months for an exam and cleaning.    Return in about 6 months (around 12/29/2023), or if symptoms worsen or fail to improve, for If symptoms persist, Follow up of last visit, video visit.    Savita Jerome MD  [facility]      Patient has been advised of split billing requirements and indicates understanding: Yes      COUNSELING:  Reviewed preventive health counseling, as reflected in patient instructions  Special attention given to:        Regular exercise       Healthy diet/nutrition       Vision screening       Hearing screening       Immunizations  Vaccinated for: Covid-19             Contraception       Osteoporosis prevention/bone health       (Diana)menopause management        She reports that she has never smoked. She has never used smokeless tobacco.      Savita Jerome MD  [facility] [place]

## 2023-06-29 NOTE — PATIENT INSTRUCTIONS
As discussed, please do fasting labs placed.     Placed referral to dermatology and Allergy specialist.     Please make a follow up up ENT when there is NO acute flare up so that they can do a sinus wash if needed to avoid recurrences of your chronic sinusitis    Sent in Antihistamine, Clobetasol gel for facial rash.     Will do all the work up for your generalized tendinopathies . Please follow home therapies discussed.   =============================          Preventive Health Recommendations  Female Ages 40 to 49    Yearly exam:     See your health care provider every year in order to  1. Review health changes.   2. Discuss preventive care.    3. Review your medicines if your doctor prescribed any.      Get a Pap test every three years (unless you have an abnormal result and your provider advises testing more often).      If you get Pap tests with HPV test, you only need to test every 5 years, unless you have an abnormal result. You do not need a Pap test if your uterus was removed (hysterectomy) and you have not had cancer.      You should be tested each year for STDs (sexually transmitted diseases), if you're at risk.     Ask your doctor if you should have a mammogram.      Have a colonoscopy (test for colon cancer) if someone in your family has had colon cancer or polyps before age 50.       Have a cholesterol test every 5 years.       Have a diabetes test (fasting glucose) after age 45. If you are at risk for diabetes, you should have this test every 3 years.    Shots: Get a flu shot each year. Get a tetanus shot every 10 years.     Nutrition:     Eat at least 5 servings of fruits and vegetables each day.    Eat whole-grain bread, whole-wheat pasta and brown rice instead of white grains and rice.    Get adequate Calcium and Vitamin D.      Lifestyle    Exercise at least 150 minutes a week (an average of 30 minutes a day, 5 days a week). This will help you control your weight and prevent disease.    Limit  alcohol to one drink per day.    No smoking.     Wear sunscreen to prevent skin cancer.    See your dentist every six months for an exam and cleaning.

## 2023-06-30 ENCOUNTER — TELEPHONE (OUTPATIENT)
Dept: ALLERGY | Facility: CLINIC | Age: 43
End: 2023-06-30
Payer: COMMERCIAL

## 2023-06-30 LAB — DEPRECATED CALCIDIOL+CALCIFEROL SERPL-MC: 35 UG/L (ref 20–75)

## 2023-06-30 NOTE — TELEPHONE ENCOUNTER
This encounter is being sent to inform the clinic that this patient has a referral from Savita Jerome MD    for the diagnoses ofRecurrent sinusitis [J32.9]  Rash of face [R21]  Pruritic disorder [L29.9]    and has requested that this patient be seen within 1-2 weeks and/or with Dr Lemus.  Based on the availability of our provider(s), we are unable to accommodate this request.      Were all sites offered this patient?  Yes      Does scheduling algorithm request to schedule next available?  Patient has been scheduled for the first available opening with Allan on 10/27/2023.  We have informed the patient that the clinic will review their referral and reach out if a sooner appointment is medically necessary.

## 2023-07-03 NOTE — TELEPHONE ENCOUNTER
Called patient to see if we could get a sooner appointment on the calender for her. Patient was able to make a July 19th appointment work with her schedule. Patient referred for recurrent sinusitis. New rash on face noted in 6/29/23 note with PCP. Also, mentions pruritic disorder that is on bilateral upper/lower extremities. Will keep October appointment for patient just in case something comes up before her July appointment. Will cancel once patient is seen in July.    Jazmin Portillo, GENTRYN, RN

## 2023-07-14 ENCOUNTER — MYC MEDICAL ADVICE (OUTPATIENT)
Dept: FAMILY MEDICINE | Facility: CLINIC | Age: 43
End: 2023-07-14
Payer: COMMERCIAL

## 2023-07-14 DIAGNOSIS — R21 RASH OF FACE: ICD-10-CM

## 2023-07-14 DIAGNOSIS — I73.00 RAYNAUD'S DISEASE WITHOUT GANGRENE: ICD-10-CM

## 2023-07-14 DIAGNOSIS — L29.9 PRURITIC DISORDER: Primary | ICD-10-CM

## 2023-07-14 DIAGNOSIS — M25.50 MULTIPLE JOINT PAIN: ICD-10-CM

## 2023-07-14 NOTE — TELEPHONE ENCOUNTER
Provider, please read the patient My Chart message and advise the triage staff.     Marva Quiros RN  [facility]

## 2023-07-19 ENCOUNTER — OFFICE VISIT (OUTPATIENT)
Dept: ALLERGY | Facility: CLINIC | Age: 43
End: 2023-07-19
Payer: COMMERCIAL

## 2023-07-19 VITALS
SYSTOLIC BLOOD PRESSURE: 97 MMHG | BODY MASS INDEX: 21.05 KG/M2 | HEART RATE: 75 BPM | RESPIRATION RATE: 20 BRPM | WEIGHT: 139.7 LBS | DIASTOLIC BLOOD PRESSURE: 63 MMHG | OXYGEN SATURATION: 100 %

## 2023-07-19 DIAGNOSIS — M25.531 PAIN OF BOTH WRIST JOINTS: Primary | ICD-10-CM

## 2023-07-19 DIAGNOSIS — L29.9 ITCHING: ICD-10-CM

## 2023-07-19 DIAGNOSIS — J01.91 ACUTE RECURRENT SINUSITIS, UNSPECIFIED LOCATION: ICD-10-CM

## 2023-07-19 DIAGNOSIS — M25.532 PAIN OF BOTH WRIST JOINTS: Primary | ICD-10-CM

## 2023-07-19 DIAGNOSIS — J32.9 RECURRENT SINUSITIS: ICD-10-CM

## 2023-07-19 PROCEDURE — 95004 PERQ TESTS W/ALRGNC XTRCS: CPT | Performed by: INTERNAL MEDICINE

## 2023-07-19 PROCEDURE — 99204 OFFICE O/P NEW MOD 45 MIN: CPT | Mod: 25 | Performed by: INTERNAL MEDICINE

## 2023-07-19 ASSESSMENT — ENCOUNTER SYMPTOMS
MYALGIAS: 0
VOMITING: 0
EYE ITCHING: 0
COUGH: 0
FEVER: 0
EYE DISCHARGE: 0
SHORTNESS OF BREATH: 0
FACIAL SWELLING: 0
CHEST TIGHTNESS: 0
CHILLS: 0
ACTIVITY CHANGE: 0
EYE REDNESS: 0
ADENOPATHY: 0
WHEEZING: 0
JOINT SWELLING: 1
SINUS PRESSURE: 0
RHINORRHEA: 0
DIARRHEA: 0
HEADACHES: 0
ARTHRALGIAS: 0
NAUSEA: 0

## 2023-07-19 NOTE — PROGRESS NOTES
Kaley Hart was seen in the Allergy Clinic at [facility].    Kaley Hart is a 42 year old female being seen today at the request of Dr. Jerome in consultation for recurrent sinus infections, itching of her palms and soles of her feet as well as diffuse joint pain.    She saw an ENT provider back in November 2022 and the rhinoscopy was normal.  ENT recommended Pepcid for a month which she is uncertain if it provided much benefit.  She had multiple sinus symptoms with likely recurrent viral infections.  She has not had any sinus infections since that time.  She has not had any imaging of her sinuses.    She had joint pain of the right middle finger since September 2022.  Over the last month or 6 weeks she has had increasing joint pain of the right big toe followed by the left foot and now more diffusely of the hands, feet, neck as well as elbows and knees.  She has a rheumatology appointment coming up in August.  She has blood work later this week including the CCP antibodies as well as rheumatoid factor and others through the primary care provider.    With the itching she is uncertain if the Allegra provided much benefit.    She has recently completed a complete metabolic panel, vitamin D, TSH and CBC which was normal.      Past Medical History:   Diagnosis Date     Other acne      Thyroid disease 11/17/2017     Unspecified contraceptive management     Nuva ring     Family History   Problem Relation Age of Onset     Other Cancer Mother         ovarian     Skin Cancer Mother      Diabetes Father      Thyroid Disease Father      Past Surgical History:   Procedure Laterality Date     ABDOMEN SURGERY  01/30/2021    Laproscopic Appendectomy     ENT SURGERY      wisdom teeth removed     EYE SURGERY  2006    Lasik     LAPAROSCOPIC APPENDECTOMY N/A 1/30/2021    Procedure: APPENDECTOMY, LAPAROSCOPIC;  Surgeon: Erum Merlos MD;  Location:  OR     NO HISTORY OF SURGERY          ENVIRONMENTAL HISTORY:   Pets inside the house include 1 cat(s).  Do you smoke cigarettes or other recreational drugs? No There is/are 0 smokers living in the house. The house does not have a damp basement.     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      Review of Systems   Constitutional: Negative for activity change, chills and fever.   HENT: Negative for congestion, dental problem, ear pain, facial swelling, nosebleeds, postnasal drip, rhinorrhea, sinus pressure and sneezing.    Eyes: Negative for discharge, redness and itching.   Respiratory: Negative for cough, chest tightness, shortness of breath and wheezing.    Cardiovascular: Negative for chest pain.   Gastrointestinal: Negative for diarrhea, nausea and vomiting.   Musculoskeletal: Positive for joint swelling. Negative for arthralgias and myalgias.   Skin: Negative for rash.   Neurological: Negative for headaches.   Hematological: Negative for adenopathy.   Psychiatric/Behavioral: Negative for behavioral problems and self-injury.         Current Outpatient Medications:      clobetasol (TEMOVATE) 0.05 % GEL topical gel, Apply a thin layer on the Rt cheek twice daily for 1 week and stop., Disp: 30 g, Rfl: 1     levothyroxine (SYNTHROID/LEVOTHROID) 50 MCG tablet, TAKE 1 TABLET BY MOUTH DAILY, Disp: 90 tablet, Rfl: 0     multivitamin w/minerals (THERA-VIT-M) tablet, Take 1 tablet by mouth daily, Disp: , Rfl:      albuterol (PROAIR HFA) 108 (90 Base) MCG/ACT inhaler, Inhale 2 puffs into the lungs every 6 hours for 30 days, Disp: 18 g, Rfl: 0     fexofenadine (ALLEGRA) 60 MG tablet, Take 1 tablet (60 mg) by mouth 2 times daily (Patient not taking: Reported on 7/19/2023), Disp: 60 tablet, Rfl: 1  Allergies   Allergen Reactions     Codeine Anaphylaxis and Other (See Comments)     Molds & Smuts Other (See Comments), Anaphylaxis and Shortness Of Breath     Other reaction(s):  Abnormal breathing     Cholesterol Support Other (See Comments)     Mold Difficulty breathing and Other (See Comments)     Seasonal Allergies Other (See Comments)     Amoxicillin-Pot Clavulanate Palpitations     Chest pain and palpitations         EXAM:   BP 97/63 (BP Location: Left arm, Patient Position: Sitting, Cuff Size: Adult Regular)   Pulse 75   Resp 20   Wt 63.4 kg (139 lb 11.2 oz)   LMP 06/27/2023   SpO2 100%   BMI 21.05 kg/m      Physical Exam    Constitutional:       General: She is not in acute distress.     Appearance: Normal appearance. She is not ill-appearing.   HENT:      Head: Normocephalic and atraumatic.      Nose: Nose normal. No congestion or rhinorrhea.      Mouth/Throat:      Mouth: Mucous membranes are moist.      Pharynx: Oropharynx is clear. No posterior oropharyngeal erythema.   Eyes:      General:         Right eye: No discharge.         Left eye: No discharge.   Cardiovascular:      Rate and Rhythm: Normal rate and regular rhythm.      Heart sounds: Normal heart sounds.   Pulmonary:      Effort: Pulmonary effort is normal.      Breath sounds: Normal breath sounds. No wheezing or rhonchi.   Skin:     General: Skin is warm.      Findings: No erythema or rash.   Neurological:      General: No focal deficit present.      Mental Status: She is alert. Mental status is at baseline.   Psychiatric:         Mood and Affect: Mood normal.         Behavior: Behavior normal.     WORKUP: Skin testing was positive to grass and a couple weeds.  However the results on her back were dermatographic so difficult to interpret    ENVIRONMENTAL PERCUTANEOUS SKIN TESTING: ADULT      7/19/2023     8:40 AM   [facility] [employer]   Consent Y   Ordering Physician Allan   Interpreting Physician Allan   Testing Technician Roby LAMA RN   Location Back   Time start: 08:40   Time End: 08:55   Positive Control: Histatrol*ALK 1 mg/ml 5/20   Negative Control: 50% Glycerin 0   Cat Hair*ALK (10,000 BAU/ml) 0   AP Dog  Hair/Dander (1:100 w/v) 0   Dust Mite p. 30,000 AU/ml 0   Dust Mite f. (30,000 AU/ml) 0   Miguelangel (W/F in millimeters) 0   Aneesh Grass (100,000 BAU/mL) 4/5   Red [place] (W/F in millimeters) 0   Maple/[place] (W/F in millimeters) 0   Hackberry (W/F in millimeters) 0   M[place] (W/F in millimeters) 0   Moro *ALK (W/F in millimeters) 0   American Elm (W/F in millimeters) 0   [place] (W/F in millimeters) 0   Black [place] (W/F in millimeters) 0   Birch Mix (W/F in millimeters) 0   [place] (W/F in millimeters) 0   Oak (W/F in millimeters) 0   Cocklebur (W/F in millimeters) 0   Arverne (W/F in millimeters) 0   White Raymundo (W/F in millimeters) 0   Careless (W/F in millimeters) 0   Nettle (W/F in millimeters) 0   English Plantain (W/F in millimeters) 4/5   Kochia (W/F in millimeters) 0   Lamb's Quarter (W/F in millimeters) 0   Marshelder (W/F in millimeters) 0   Ragweed Mix* ALK (W/F in millimeters) 3/5   Russian Thistle (W/F in millimeters) 0   Sagebrush/Mugwort (W/F in millimeters) 0   Sheep Sorrel (W/F in millimeters) 0   Feather Mix* ALK (W/F in millimeters) 0   Penicillium Mix (1:10 w/v) 0   Curvularia spicifera (1:10 w/v) 0   Epicoccum (1:10 w/v) 0   Aspergillus fumigatus (1:10 w/v): 0   Alternaria tenius (1:10 w/v) 0   H. Cladosporium (1:10 w/v) 0   Phoma herbarum (1:10 w/v) 0      Verbal consent obtained for skin testing    ASSESSMENT/PLAN:  Kaley Hart is a 42 year old female seen today for itching of the soles and palms as well as joint pain for which she is referred rheumatology and recurrent sinus symptoms concerning for sinus infections or allergic etiology.  Today skin testing was mildly positive to grass and weeds.    1. We will check additional labs due to the itching and the diffuse arthralgia.  2. Consider starting Xyzal 5 mg once to twice daily to see if this helps with the itch.  3. Skin testing was positive to grass and ragweed today.  Due to the dermatographia on your back we will do  blood testing for additional allergens and to confirm the positive tests today.  4. We will contact you with results of the blood test.  5. I do not see a role for Biologics at today's appointment.    Follow-up to be determined based on testing.      Thank you for allowing me to participate in the care of Kaley Hart.      I spent 45 minutes on the date of the encounter doing chart review, history and exam, documentation and further coordination as noted above exclusive of separately reported interpretations    Ancelmo Lemus MD  Allergy/Immunology  [facility]

## 2023-07-19 NOTE — PROGRESS NOTES
Verbal consent was obtained prior to procedure by the provider. Per provider verbal order, placed Adult Environmental Panel scratch test(s). Once antigens were placed, patient was monitored for 15 minutes in clinic. Provider read test after 15 minutes. Patient tolerated procedure well. All questions and concerns were addressed at office visit by the provider.     Roby MIX RN, BSN

## 2023-07-19 NOTE — LETTER
7/19/2023         RE: Kaley Hart  [address]  [place] [address]        Dear Colleague,    Thank you for referring your patient, Kaley Hart, to the [facility] [place]. Please see a copy of my visit note below.    Kaley Hart was seen in the Allergy Clinic at [facility].    Kaley Hart is a 42 year old female being seen today at the request of Dr. Jerome in consultation for recurrent sinus infections, itching of her palms and soles of her feet as well as diffuse joint pain.    She saw an ENT provider back in November 2022 and the rhinoscopy was normal.  ENT recommended Pepcid for a month which she is uncertain if it provided much benefit.  She had multiple sinus symptoms with likely recurrent viral infections.  She has not had any sinus infections since that time.  She has not had any imaging of her sinuses.    She had joint pain of the right middle finger since September 2022.  Over the last month or 6 weeks she has had increasing joint pain of the right big toe followed by the left foot and now more diffusely of the hands, feet, neck as well as elbows and knees.  She has a rheumatology appointment coming up in August.  She has blood work later this week including the CCP antibodies as well as rheumatoid factor and others through the primary care provider.    With the itching she is uncertain if the Allegra provided much benefit.    She has recently completed a complete metabolic panel, vitamin D, TSH and CBC which was normal.      Past Medical History:   Diagnosis Date     Other acne      Thyroid disease 11/17/2017     Unspecified contraceptive management     Nuva ring     Family History   Problem Relation Age of Onset     Other Cancer Mother         ovarian     Skin Cancer Mother      Diabetes Father      Thyroid Disease Father      Past Surgical History:   Procedure Laterality Date     ABDOMEN SURGERY  01/30/2021    Laproscopic  Appendectomy     ENT SURGERY      wisdom teeth removed     EYE SURGERY  2006    Lasik     LAPAROSCOPIC APPENDECTOMY N/A 1/30/2021    Procedure: APPENDECTOMY, LAPAROSCOPIC;  Surgeon: Erum Merlos MD;  Location: [facility] OR     NO HISTORY OF SURGERY         ENVIRONMENTAL HISTORY:   Pets inside the house include 1 cat(s).  Do you smoke cigarettes or other recreational drugs? No There is/are 0 smokers living in the house. The house does not have a damp basement.     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      Review of Systems   Constitutional: Negative for activity change, chills and fever.   HENT: Negative for congestion, dental problem, ear pain, facial swelling, nosebleeds, postnasal drip, rhinorrhea, sinus pressure and sneezing.    Eyes: Negative for discharge, redness and itching.   Respiratory: Negative for cough, chest tightness, shortness of breath and wheezing.    Cardiovascular: Negative for chest pain.   Gastrointestinal: Negative for diarrhea, nausea and vomiting.   Musculoskeletal: Positive for joint swelling. Negative for arthralgias and myalgias.   Skin: Negative for rash.   Neurological: Negative for headaches.   Hematological: Negative for adenopathy.   Psychiatric/Behavioral: Negative for behavioral problems and self-injury.         Current Outpatient Medications:      clobetasol (TEMOVATE) 0.05 % GEL topical gel, Apply a thin layer on the Rt cheek twice daily for 1 week and stop., Disp: 30 g, Rfl: 1     levothyroxine (SYNTHROID/LEVOTHROID) 50 MCG tablet, TAKE 1 TABLET BY MOUTH DAILY, Disp: 90 tablet, Rfl: 0     multivitamin w/minerals (THERA-VIT-M) tablet, Take 1 tablet by mouth daily, Disp: , Rfl:      albuterol (PROAIR HFA) 108 (90 Base) MCG/ACT inhaler, Inhale 2 puffs into the lungs every 6 hours for 30 days, Disp: 18 g, Rfl: 0     fexofenadine (ALLEGRA) 60 MG tablet, Take 1 tablet (60 mg) by mouth 2 times daily  (Patient not taking: Reported on 7/19/2023), Disp: 60 tablet, Rfl: 1  Allergies   Allergen Reactions     Codeine Anaphylaxis and Other (See Comments)     Molds & Smuts Other (See Comments), Anaphylaxis and Shortness Of Breath     Other reaction(s): Abnormal breathing     Cholesterol Support Other (See Comments)     Mold Difficulty breathing and Other (See Comments)     Seasonal Allergies Other (See Comments)     Amoxicillin-Pot Clavulanate Palpitations     Chest pain and palpitations         EXAM:   BP 97/63 (BP Location: Left arm, Patient Position: Sitting, Cuff Size: Adult Regular)   Pulse 75   Resp 20   Wt 63.4 kg (139 lb 11.2 oz)   LMP 06/27/2023   SpO2 100%   BMI 21.05 kg/m      Physical Exam    Constitutional:       General: She is not in acute distress.     Appearance: Normal appearance. She is not ill-appearing.   HENT:      Head: Normocephalic and atraumatic.      Nose: Nose normal. No congestion or rhinorrhea.      Mouth/Throat:      Mouth: Mucous membranes are moist.      Pharynx: Oropharynx is clear. No posterior oropharyngeal erythema.   Eyes:      General:         Right eye: No discharge.         Left eye: No discharge.   Cardiovascular:      Rate and Rhythm: Normal rate and regular rhythm.      Heart sounds: Normal heart sounds.   Pulmonary:      Effort: Pulmonary effort is normal.      Breath sounds: Normal breath sounds. No wheezing or rhonchi.   Skin:     General: Skin is warm.      Findings: No erythema or rash.   Neurological:      General: No focal deficit present.      Mental Status: She is alert. Mental status is at baseline.   Psychiatric:         Mood and Affect: Mood normal.         Behavior: Behavior normal.     WORKUP: Skin testing was positive to grass and a couple weeds.  However the results on her back were dermatographic so difficult to interpret    ENVIRONMENTAL PERCUTANEOUS SKIN TESTING: ADULT      7/19/2023     8:40 AM   [facility] [employer]   Consent Y   Ordering Physician  Allan   Interpreting Physician Allan   Testing Technician Roby LAMA RN   Location Back   Time start: 08:40   Time End: 08:55   Positive Control: Histatrol*ALK 1 mg/ml 5/20   Negative Control: 50% Glycerin 0   Cat Hair*ALK (10,000 BAU/ml) 0   AP Dog Hair/Dander (1:100 w/v) 0   Dust Mite p. 30,000 AU/ml 0   Dust Mite f. (30,000 AU/ml) 0   Miguelangel (W/F in millimeters) 0   Aneesh Grass (100,000 BAU/mL) 4/5   Red [place] (W/F in millimeters) 0   Maple/[place] (W/F in millimeters) 0   Hackberry (W/F in millimeters) 0   [place]red[place] (W/F in millimeters) 0   Osage *ALK (W/F in millimeters) 0   American Elm (W/F in millimeters) 0   [place] (W/F in millimeters) 0   Black [place] (W/F in millimeters) 0   Birch Mix (W/F in millimeters) 0   [place] (W/F in millimeters) 0   Oak (W/F in millimeters) 0   Cocklebur (W/F in millimeters) 0   [place] (W/F in millimeters) 0   White Raymundo (W/F in millimeters) 0   Careless (W/F in millimeters) 0   Nettle (W/F in millimeters) 0   English Plantain (W/F in millimeters) 4/5   Kochia (W/F in millimeters) 0   Lamb's Quarter (W/F in millimeters) 0   Marshelder (W/F in millimeters) 0   Ragweed Mix* ALK (W/F in millimeters) 3/5   Russian Thistle (W/F in millimeters) 0   Sagebrush/Mugwort (W/F in millimeters) 0   Sheep Sorrel (W/F in millimeters) 0   Feather Mix* ALK (W/F in millimeters) 0   Penicillium Mix (1:10 w/v) 0   Curvularia spicifera (1:10 w/v) 0   Epicoccum (1:10 w/v) 0   Aspergillus fumigatus (1:10 w/v): 0   Alternaria tenius (1:10 w/v) 0   H. Cladosporium (1:10 w/v) 0   Phoma herbarum (1:10 w/v) 0      Verbal consent obtained for skin testing    ASSESSMENT/PLAN:  Kaley Hart is a 42 year old female seen today for itching of the soles and palms as well as joint pain for which she is referred rheumatology and recurrent sinus symptoms concerning for sinus infections or allergic etiology.  Today skin testing was mildly positive to grass and weeds.    1. We will check additional labs  due to the itching and the diffuse arthralgia.  2. Consider starting Xyzal 5 mg once to twice daily to see if this helps with the itch.  3. Skin testing was positive to grass and ragweed today.  Due to the dermatographia on your back we will do blood testing for additional allergens and to confirm the positive tests today.  4. We will contact you with results of the blood test.  5. I do not see a role for Biologics at today's appointment.    Follow-up to be determined based on testing.      Thank you for allowing me to participate in the care of Kaley Hart.      I spent 45 minutes on the date of the encounter doing chart review, history and exam, documentation and further coordination as noted above exclusive of separately reported interpretations    Ancelmo Lemus MD  Allergy/Immunology  [facility]      Verbal consent was obtained prior to procedure by the provider. Per provider verbal order, placed Adult Environmental Panel scratch test(s). Once antigens were placed, patient was monitored for 15 minutes in clinic. Provider read test after 15 minutes. Patient tolerated procedure well. All questions and concerns were addressed at office visit by the provider.     Roby MIX RN, BSN      Again, thank you for allowing me to participate in the care of your patient.        Sincerely,        Ancelmo Lemus MD

## 2023-07-19 NOTE — PATIENT INSTRUCTIONS
We will check additional labs due to the itching and the diffuse arthralgia.  Consider starting Xyzal 5 mg once to twice daily to see if this helps with the itch.  Skin testing was positive to grass and ragweed today.  Due to the dermatographia on your back we will do blood testing for additional allergens and to confirm the positive tests today.  We will contact you with results of the blood test.  I do not see a role for Biologics at today's appointment.

## 2023-07-20 ENCOUNTER — NURSE TRIAGE (OUTPATIENT)
Dept: FAMILY MEDICINE | Facility: CLINIC | Age: 43
End: 2023-07-20
Payer: COMMERCIAL

## 2023-07-20 DIAGNOSIS — M25.50 MULTIPLE JOINT PAIN: Primary | ICD-10-CM

## 2023-07-20 NOTE — TELEPHONE ENCOUNTER
Patient given message from Dr. Hi. Patient agrees to keep lab only appointment for tomorrow and wait for the results. Angie Clifton RN

## 2023-07-20 NOTE — TELEPHONE ENCOUNTER
APPRORIATE appropriate labs have already been ordered.  ADDED ADDITIONAL uric ACID AND LYME DISEASE LAB .PCP is not in the office if needed further labs can be ordered however at this time she should do the blood work which is scheduled.

## 2023-07-20 NOTE — TELEPHONE ENCOUNTER
TO PCP:     Pt called complaining of persistent/worsening joint aching     Patient called back has upcoming visit with rheumatology in September    No diagnosis has been concluded     ONSET: prior to last visit - 6/28/23 saw PCP, mention right large toe was hurting, had correlated with wearing heels to conference. Assumed was a bunion and switched to flat shoes. Was having ankle aches as well. Itching started Nov 2022  LOCATION: last night worsening, into shoulder sockets - weak/sore. Palm cramping, feet pain,headaches, fingers tender to touch.   AGGRAVATING: standing is worse   ALLEVIATING: stretching   SEVERITY: at times 6-7/10 sitting is 3-4/10   Not the same as working out pains    OTHER: Sugar makes her feel ill, has been on low inflammation low sugar and no gluten diet for 2 weeks   Not taking any medications - does not want to be on APAP all day/every day and doesn't want to just take pain medic  Not aware of any tick/bug bites recently. Back of left leg had a bug bite (spider?) that itched and went away   No nausea, but stomach gurgling, no urinary or GI symptoms    Patient has upcoming lab visit tomorrow to check the labs you ordered, since symptoms are worsening/persisting asking if you recommend anything else in the meantime?     *ok for a detailed message     Appointments in Next Year    Jul 21, 2023  1:15 PM  Lab visit with [facility]  [facility] ([facility] ) [phone]   Dec 13, 2023  8:30 AM  (Arrive by 8:15 AM)  New Visit with LUIS ANGEL Whaley CNP  [facility] ([facility] ) [phone]          Rashard Mayo RN  [facility]       Reason for Disposition    MODERATE pain (e.g., interferes with normal activities) and present > 3 days    Additional Information    Negative: Shock suspected (e.g., cold/pale/clammy skin, too weak to stand,  low BP, rapid pulse)    Negative: Difficult to awaken or acting confused (e.g., disoriented, slurred speech)    Negative: Sounds like a life-threatening emergency to the triager    Negative: Chest pain    Negative: Arm pains with exertion (e.g., walking)    Negative: Muscle aches from influenza (flu) suspected    Negative: Muscle aches from heat exposure suspected    Negative: Lyme disease suspected (e.g., bull's eye rash or tick bite / exposure in past month)    Negative: Pain only in back    Negative: Pain in one arm OR arm pains caused by recent vigorous activity (e.g., sports, lifting, overuse)    Negative: Pain in one leg OR leg pains caused by recent vigorous activity (e.g., sports, lifting, overuse)    Negative: Rash over large area or most of the body (widespread or generalized)    Negative: Dark (cola colored) or red-colored urine    Negative: Drinking very little and dehydration suspected (e.g., no urine > 12 hours, very dry mouth, very lightheaded)    Negative: Patient sounds very sick or weak to the triager    Negative: SEVERE pain (e.g., excruciating, unable to do any normal activities) and not improved 2 hours after pain medicine    Negative: SEVERE pain and taking a statin medicine (a lipid or cholesterol lowering drug)    Negative: Fever > 104 F (40 C)    Negative: Fever > 101 F (38.3 C) and over 60 years of age    Negative: Fever > 100.0 F  (37.8 C) and bedridden (e.g., nursing home patient, CVA, chronic illness, recovering from surgery)    Negative: Fever > 100.0 F (37.8 C) and indwelling urinary catheter (e.g., Kulkarni, coude)    Negative: Fever > 100.0 F (37.8 C) and diabetes mellitus or weak immune system (e.g., HIV positive, cancer chemo, splenectomy, organ transplant, chronic steroids)    Negative: Fever present > 3 days (72 hours)    Negative: Muscle aches are unexplained and occur within 1 month of a tick bite    Negative: Diabetes mellitus or weak immune system (e.g., HIV positive, cancer  chemo, splenectomy, organ transplant, chronic steroids)    Protocols used: MUSCLE ACHES AND BODY PAIN-A-[place]

## 2023-07-21 ENCOUNTER — LAB (OUTPATIENT)
Dept: LAB | Facility: CLINIC | Age: 43
End: 2023-07-21
Payer: COMMERCIAL

## 2023-07-21 DIAGNOSIS — L29.9 ITCHING: ICD-10-CM

## 2023-07-21 DIAGNOSIS — I73.00 RAYNAUD'S DISEASE WITHOUT GANGRENE: ICD-10-CM

## 2023-07-21 DIAGNOSIS — M25.50 MULTIPLE JOINT PAIN: ICD-10-CM

## 2023-07-21 DIAGNOSIS — M25.531 PAIN OF BOTH WRIST JOINTS: ICD-10-CM

## 2023-07-21 DIAGNOSIS — J01.91 ACUTE RECURRENT SINUSITIS, UNSPECIFIED LOCATION: ICD-10-CM

## 2023-07-21 DIAGNOSIS — R21 RASH OF FACE: ICD-10-CM

## 2023-07-21 DIAGNOSIS — M25.532 PAIN OF BOTH WRIST JOINTS: ICD-10-CM

## 2023-07-21 LAB
CRP SERPL-MCNC: <3 MG/L
ERYTHROCYTE [SEDIMENTATION RATE] IN BLOOD BY WESTERGREN METHOD: 6 MM/HR (ref 0–20)
URATE SERPL-MCNC: 5 MG/DL (ref 2.4–5.7)

## 2023-07-21 PROCEDURE — 85652 RBC SED RATE AUTOMATED: CPT

## 2023-07-21 PROCEDURE — 86200 CCP ANTIBODY: CPT

## 2023-07-21 PROCEDURE — 84550 ASSAY OF BLOOD/URIC ACID: CPT

## 2023-07-21 PROCEDURE — 36415 COLL VENOUS BLD VENIPUNCTURE: CPT

## 2023-07-21 PROCEDURE — 86003 ALLG SPEC IGE CRUDE XTRC EA: CPT

## 2023-07-21 PROCEDURE — 86618 LYME DISEASE ANTIBODY: CPT

## 2023-07-21 PROCEDURE — 86140 C-REACTIVE PROTEIN: CPT

## 2023-07-21 PROCEDURE — 86431 RHEUMATOID FACTOR QUANT: CPT

## 2023-07-21 PROCEDURE — 86039 ANTINUCLEAR ANTIBODIES (ANA): CPT

## 2023-07-21 PROCEDURE — 86038 ANTINUCLEAR ANTIBODIES: CPT

## 2023-07-24 LAB
A ALTERNATA IGE QN: <0.1 KU(A)/L
B BURGDOR IGG+IGM SER QL: 0.16
CAT DANDER IGG QN: <0.1 KU(A)/L
CCP AB SER IA-ACNC: 1.2 U/ML
COMMON RAGWEED IGE QN: <0.1 KU(A)/L
D FARINAE IGE QN: <0.1 KU(A)/L
D PTERONYSS IGE QN: <0.1 KU(A)/L
MAPLE IGE QN: <0.1 KU(A)/L
RHEUMATOID FACT SER NEPH-ACNC: <6 IU/ML
SALTWORT IGE QN: <0.1 KU(A)/L
SILVER BIRCH IGE QN: <0.1 KU(A)/L
TIMOTHY IGE QN: <0.1 KU(A)/L

## 2023-07-26 LAB
ANA PAT SER IF-IMP: ABNORMAL
ANA PAT SER IF-IMP: ABNORMAL
ANA SER QL IF: POSITIVE
ANA TITR SER IF: ABNORMAL {TITER}
ANA TITR SER IF: ABNORMAL {TITER}

## 2023-07-27 ENCOUNTER — MYC MEDICAL ADVICE (OUTPATIENT)
Dept: FAMILY MEDICINE | Facility: CLINIC | Age: 43
End: 2023-07-27
Payer: COMMERCIAL

## 2023-07-27 NOTE — TELEPHONE ENCOUNTER
Wesly Roche,     Here are my comments about the recent results.     Your YING is positive which is most likely rheumatological condition and needing further recommendations from Rheumatology .  All your other connective tissue panel was normal. Recommend to follow with Rheumatology on the referral placed for further recommendations given your current joint symptoms. Please keep up your appointment with them.     Please let us know if you have any questions or concerns.     Regards,  Savita Jerome MD   Written by Savita Jerome MD on 7/27/2023 12:26 AM CDT  Seen by patient Kaley Hart on 7/27/2023  6:52 AM      Patient has ead the lab results and provider recommendations.       Will close the encounter.       Marva Quiros RN  -[facility]

## 2023-07-27 NOTE — TELEPHONE ENCOUNTER
----- Message from Savita Jerome MD sent at 7/27/2023 12:26 AM CDT -----  Your YING is positive which is most likely rheumatological condition and needing further recommendations from Rheumatology .  All your other connective tissue panel was normal. Recommend to follow with Rheumatology on the referral placed for further recommendations given your current joint symptoms. Please keep up your appointment with them.

## 2023-09-10 DIAGNOSIS — E06.3 HYPOTHYROIDISM DUE TO HASHIMOTO'S THYROIDITIS: ICD-10-CM

## 2023-09-11 RX ORDER — LEVOTHYROXINE SODIUM 50 UG/1
TABLET ORAL
Qty: 90 TABLET | Refills: 0 | Status: SHIPPED | OUTPATIENT
Start: 2023-09-11 | End: 2023-12-18

## 2023-09-12 ENCOUNTER — TRANSFERRED RECORDS (OUTPATIENT)
Dept: HEALTH INFORMATION MANAGEMENT | Facility: CLINIC | Age: 43
End: 2023-09-12
Payer: COMMERCIAL

## 2023-10-06 ENCOUNTER — TELEPHONE (OUTPATIENT)
Dept: FAMILY MEDICINE | Facility: CLINIC | Age: 43
End: 2023-10-06
Payer: COMMERCIAL

## 2023-10-06 NOTE — TELEPHONE ENCOUNTER
"TO PCP    Patient calling stating, \"I saw allergist and rheumatologist. They state my YING is positive as a result of my hypothyroidism. They say they would refer me back to the primary. I'm still having pain in joints, achy and just painful all around and the sensation of the itch is still in left hand, occasional in right and occasional in feet. I have not had gluten or extra sugars I your diet. Late last week I had a sharp shooting pain in my body and got crampy and then it went away, just pains every once in awhile and don't know if it's related or not or helpful. Per Dr. Jerome's recommendations I am only wearing flat shoes as well. Wondering about neurology referral. Seems like a logical association to the symptoms.\"    Please advise.     Shelby Breen RN on 10/6/2023 at 3:49 PM    "

## 2023-10-09 ENCOUNTER — E-VISIT (OUTPATIENT)
Dept: FAMILY MEDICINE | Facility: CLINIC | Age: 43
End: 2023-10-09
Payer: COMMERCIAL

## 2023-10-09 DIAGNOSIS — G62.9 NEUROPATHY: Primary | ICD-10-CM

## 2023-10-09 PROCEDURE — 99423 OL DIG E/M SVC 21+ MIN: CPT | Performed by: INTERNAL MEDICINE

## 2023-10-09 RX ORDER — GABAPENTIN 100 MG/1
100 CAPSULE ORAL 3 TIMES DAILY
Qty: 90 CAPSULE | Refills: 0 | Status: SHIPPED | OUTPATIENT
Start: 2023-10-09 | End: 2023-11-08

## 2023-10-09 NOTE — TELEPHONE ENCOUNTER
I am unable to substantiate neurological signs on the mentioned documentation. Can pt submit Evisit through [employer] and document on where is her pain - Other than joint pains as evaluated in the past.

## 2023-10-09 NOTE — TELEPHONE ENCOUNTER
Pt notified of PCP response below     Agrees to detailed e-visit as requested    Tammy MOSLEY, Triage RN  [facility]

## 2023-10-10 NOTE — PATIENT INSTRUCTIONS
Thank you for choosing us for your care. I have placed an order for a prescription so that you can start treatment. View your full visit summary for details by clicking on the link below. Your pharmacist will able to address any questions you may have about the medication.     If you're not feeling better within 5-7 days, please schedule an appointment.  You can schedule an appointment right here in [facility], or call [phone]  If the visit is for the same symptoms as your eVisit, we'll refund the cost of your eVisit if seen within seven days.

## 2023-10-10 NOTE — TELEPHONE ENCOUNTER
"Provider E-Visit time total (minutes): 21 minutes      Sent in [employer] message as below -     Wesly Roche.    Appreciate the description of your symptoms which can help me place there referral more authentically. I am sorry that Rheumatology has not given the solution for your symptoms which I was positive more on your clinical symptoms of joint pains, Raynauds, YING positive along with autoimmune condition of Hashimotos . But when specialist didn't give much importance to YING in these scenarios as I understand also happens in some with thyroid condition only - we will have to accept that anyways.     Coming to your multiple neuropathic pains as mentioned on various extremities and joints , OK to get neurology inputs if they would consider doing \" Nerve Conduction Studies \" for getting a definitive diagnosis versus any additional work up if they consider to get to the diagnosis.   It would be reasonable to consider medication such as gabapentin for improvement of your symptoms.Sent in medication to pharmacy.     We have recently done all the labs on B12, A1C check etc., which can lead to symptoms but all of these were normal.     Please let me know if you have any questions.     Thank you  Savita Jerome MD on 10/9/2023              "

## 2023-11-08 ENCOUNTER — OFFICE VISIT (OUTPATIENT)
Dept: NEUROLOGY | Facility: CLINIC | Age: 43
End: 2023-11-08
Attending: INTERNAL MEDICINE
Payer: COMMERCIAL

## 2023-11-08 ENCOUNTER — LAB (OUTPATIENT)
Dept: LAB | Facility: CLINIC | Age: 43
End: 2023-11-08
Payer: COMMERCIAL

## 2023-11-08 VITALS
RESPIRATION RATE: 16 BRPM | HEART RATE: 64 BPM | OXYGEN SATURATION: 100 % | SYSTOLIC BLOOD PRESSURE: 112 MMHG | DIASTOLIC BLOOD PRESSURE: 73 MMHG

## 2023-11-08 DIAGNOSIS — G56.22 ULNAR NEUROPATHY AT ELBOW OF LEFT UPPER EXTREMITY: ICD-10-CM

## 2023-11-08 DIAGNOSIS — G56.22 ULNAR NEUROPATHY AT ELBOW OF LEFT UPPER EXTREMITY: Primary | ICD-10-CM

## 2023-11-08 DIAGNOSIS — R20.2 PARESTHESIA: ICD-10-CM

## 2023-11-08 DIAGNOSIS — R25.2 CRAMP OF LIMB: ICD-10-CM

## 2023-11-08 LAB
ALBUMIN SERPL BCG-MCNC: 4.4 G/DL (ref 3.5–5.2)
ALP SERPL-CCNC: 56 U/L (ref 35–104)
ALT SERPL W P-5'-P-CCNC: 11 U/L (ref 0–50)
ANION GAP SERPL CALCULATED.3IONS-SCNC: 11 MMOL/L (ref 7–15)
AST SERPL W P-5'-P-CCNC: 20 U/L (ref 0–45)
BILIRUB SERPL-MCNC: 0.9 MG/DL
BUN SERPL-MCNC: 12.7 MG/DL (ref 6–20)
CALCIUM SERPL-MCNC: 9.3 MG/DL (ref 8.6–10)
CHLORIDE SERPL-SCNC: 103 MMOL/L (ref 98–107)
CREAT SERPL-MCNC: 0.87 MG/DL (ref 0.51–0.95)
DEPRECATED HCO3 PLAS-SCNC: 25 MMOL/L (ref 22–29)
EGFRCR SERPLBLD CKD-EPI 2021: 85 ML/MIN/1.73M2
GLUCOSE SERPL-MCNC: 84 MG/DL (ref 70–99)
MAGNESIUM SERPL-MCNC: 2.2 MG/DL (ref 1.7–2.3)
POTASSIUM SERPL-SCNC: 4 MMOL/L (ref 3.4–5.3)
PROT SERPL-MCNC: 7 G/DL (ref 6.4–8.3)
SODIUM SERPL-SCNC: 139 MMOL/L (ref 135–145)

## 2023-11-08 PROCEDURE — 36415 COLL VENOUS BLD VENIPUNCTURE: CPT

## 2023-11-08 PROCEDURE — 83735 ASSAY OF MAGNESIUM: CPT

## 2023-11-08 PROCEDURE — 99205 OFFICE O/P NEW HI 60 MIN: CPT | Performed by: STUDENT IN AN ORGANIZED HEALTH CARE EDUCATION/TRAINING PROGRAM

## 2023-11-08 PROCEDURE — 80053 COMPREHEN METABOLIC PANEL: CPT

## 2023-11-08 NOTE — PROGRESS NOTES
CHIEF COMPLAINT / REASON FOR VISIT  Paresthesia, aching pain, cramps, hypersensitivity in several parts of body    Referred by Dr. Jerome    HISTORY OF PRESENT ILLNESS   is a 42 year old female presenting [facility] for evaluation of paresthesia, aching pain, cramps, and hypersensitivity in several parts of body and a question of peripheral neuropathy.     Her past medical history is significant for Hashimoto's thyroiditis and Raynaud's.    She describes multiple symptoms today.  -Intermittent aching pain in the toes and forefeet: Started after wearing high heels to a conference in June 2023.  Symptoms come and go.  Sometimes associated with cramping sensation in the toes and heels.   -Radiating pain from elbow down to medial side of forearm into the last 3 digits: Symptoms are bilateral but much worse and more frequent on the left side.  She is experiencing this almost every other day.  She sometimes gets pain in the elbow after leaning on the armrest.  She also reports intermittent numbness/tingling sensation in the last 3 fingers of both hands (but worse on left)  -Frequent itching in the palms, sometimes legs, sometimes feet in the past 1 year  -Hypersensitivity to touch in several parts of body with variable location    No persistent numbness.  No muscle weakness but she feels generalized fatigue in the past few months.   Of note, she has changed to a gluten-free and low sugar diet in the past few months.  She has lost 10 pounds with the diet change but has not experienced any changes in her symptoms.  She is doing 10-minute of yoga per day but has been limiting high intensity exercise.    She does not drink alcohol.  She works in [employer] and spends most of her days on the desk.    She has been evaluated by rheumatology and no underlying rheumatological disorder was diagnosed.  Prior investigations are listed below.  -CRP <3, ESR 6  - YING weakly positive at 1:160  - RF, CCP  negative  -Hemoglobin A1c 5.1%  -Vitamin B12 493  -Triglyceride 55  -TSH 2.09  -Hepatitis C antibody and HIV negative    REVIEW OF SYSTEMS  All negative except for what indicated in the HPI. The following portions of the patient's history were reviewed and updated as appropriate: allergies, current medications, family history, medical history, surgical history, social history, and problem list.     PAST MEDICAL/SURGICAL HISTORY   Past Medical History:   Diagnosis Date    Other acne     Thyroid disease 11/17/2017    Unspecified contraceptive management     Nuva ring     Past Surgical History:   Procedure Laterality Date    ABDOMEN SURGERY  01/30/2021    Laproscopic Appendectomy    ENT SURGERY      wisdom teeth removed    EYE SURGERY  2006    Lasik    LAPAROSCOPIC APPENDECTOMY N/A 1/30/2021    Procedure: APPENDECTOMY, LAPAROSCOPIC;  Surgeon: Erum Merlos MD;  Location:  OR    NO HISTORY OF SURGERY          MEDICATIONS    Current Outpatient Medications:     albuterol (PROAIR HFA) 108 (90 Base) MCG/ACT inhaler, Inhale 2 puffs into the lungs every 6 hours for 30 days, Disp: 18 g, Rfl: 0    fexofenadine (ALLEGRA) 60 MG tablet, Take 1 tablet (60 mg) by mouth 2 times daily (Patient not taking: Reported on 7/19/2023), Disp: 60 tablet, Rfl: 1    gabapentin (NEURONTIN) 100 MG capsule, Take 1 capsule (100 mg) by mouth 3 times daily, Disp: 90 capsule, Rfl: 0    levothyroxine (SYNTHROID/LEVOTHROID) 50 MCG tablet, TAKE 1 TABLET BY MOUTH DAILY, Disp: 90 tablet, Rfl: 0    multivitamin w/minerals (THERA-VIT-M) tablet, Take 1 tablet by mouth daily, Disp: , Rfl:     ALLERGIES:  Allergies   Allergen Reactions    Codeine Anaphylaxis and Other (See Comments)    Molds & Smuts Other (See Comments), Anaphylaxis and Shortness Of Breath     Other reaction(s): Abnormal breathing    Cholesterol Support Other (See Comments)    Mold Difficulty breathing and Other (See Comments)    Seasonal Allergies Other (See Comments)    Amoxicillin-Pot  Clavulanate Palpitations     Chest pain and palpitations       PHYSICAL EXAM    NEUROLOGICAL EXAMINATION  Mental status: normal.  Speech: normal.  Muscle strength: Normal muscle strength 5/5 proximally and distally in upper and lower limbs.  No weakness in wrist flexor, FDI, or ADM.  Sensation: Minimally decreased sensation to light touch and pinprick in the fourth and fifth digits of left more than right hand.  Intact sensation to light touch, pinprick, cold temperature, vibration, and joint proprioception in lower limbs.  Deep tendon reflexes: Normal reflexes in the upper and lower limbs including intact bilateral ankle reflexes  High arched feet: Absent  Hammertoes: Absent  Coordination: normal rapid alternating movements and finger to nose testing  Gait: normal.  Walk on heels: yes, bilaterally.  Walk on toes: yes, bilaterally.    Getting up from seated position without pushing on chair: yes.  Posture: normal.  Romberg: negative.    ASSESSMENT / PLAN  #1 Itching and hypersensitivity to touch in several parts of the body  #2 R/O ulnar neuropathy at the elbow  #3 Cramps and aching pain     Ms. Hart's neurological exam today is largely unremarkable other than minimally decreased sensation to light touch and pinprick in the forth and fifth digits of left > right hands. These findings together with her description of pain radiating down the elbow and medial forearm could suggest a mild ulnar neuropathy at the elbow. After discussion, we agreed to proceed with EMG/NCS to further evaluate for this.  I recommend that she avoid leaning on the elbow while sitting or driving, avoid prolonged elbow flexion, and use elbow pad.     We also discussed that itching and hypersensitivity to touch are nonspecific symptoms.  Although these can be seen in patients with neuropathy, the variation in location, character, and frequency of her symptoms would point away from nerve damage/neuropathy.  The preserved sensation to all  modalities in her feet and normal bilateral ankle reflexes are very reassuring and would point away from a length-dependent neuropathic process.  As she is getting EMG for ulnar neuropathy, we will also look for evidence of large-fiber peripheral neuropathy on the nerve conduction studies.  She has already had thorough work up that did not reveal any evidence of underlying rheumatological disorder or vitamin deficiency.  No further laboratory work-up is needed from neurological standpoint.    Lastly, there may be component of musculoskeletal contributing to her overall symptoms especially aching pain and cramps that seem to be mechanical.  I recommend that she start back on exercising regularly for 30 min/session.  Low intensity, stretching exercise would be helpful.  I will add labs to check for electrolyte abnormalities today.    Multiple questions were answered.  I spent a total of 60 minutes on the day of the visit for chart review, face-to-face visit, counseling/coordination of care, and documentation. Please see the note for further information on patient assessment and treatment.       PATIENT EDUCATION  Ready to learn, no apparent learning barriers were identified; learning preferences include listening.  Explained diagnosis and treatment plan; patient expressed understanding of the content.    ADDENDUM  I have reviewed the results of your EMG. The test findings confirmed a left ulnar neuropathy at the elbow. The findings are mild but suggest that the ulnar nerve is frequently being compressed in the cubital tunnel at th elbow (cubital tunnel syndrome). To avoid worsening of symptoms, I recommend avoiding leaning on your elbow while sitting or driving. Over-the-counter elbow pad can also help prevent prolonged elbow flexion and provide extra cushion over the area. If symptoms continue to worse, I recommend an evaluation with orthopedic specialist for further management.     Otherwise, the test did not show  any evidence of peripheral neuropathy.     I discussed the above with patient through [employer] message.

## 2023-11-08 NOTE — LETTER
11/8/2023         RE: Kaley Hart  [address]  [place] [address]        Dear Colleague,    Thank you for referring your patient, Kaley Hart, to the [facility] [place]. Please see a copy of my visit note below.    CHIEF COMPLAINT / REASON FOR VISIT  Paresthesia, aching pain, cramps, hypersensitivity in several parts of body    Referred by Dr. Jerome    HISTORY OF PRESENT ILLNESS   is a 42 year old female presenting to [facility] for evaluation of paresthesia, aching pain, cramps, and hypersensitivity in several parts of body and a question of peripheral neuropathy.     Her past medical history is significant for Hashimoto's thyroiditis and Raynaud's.    She describes multiple symptoms today.  -Intermittent aching pain in the toes and forefeet: Started after wearing high heels to a conference in June 2023.  Symptoms come and go.  Sometimes associated with cramping sensation in the toes and heels.   -Radiating pain from elbow down to medial side of forearm into the last 3 digits: Symptoms are bilateral but much worse and more frequent on the left side.  She is experiencing this almost every other day.  She sometimes gets pain in the elbow after leaning on the armrest.  She also reports intermittent numbness/tingling sensation in the last 3 fingers of both hands (but worse on left)  -Frequent itching in the palms, sometimes legs, sometimes feet in the past 1 year  -Hypersensitivity to touch in several parts of body with variable location    No persistent numbness.  No muscle weakness but she feels generalized fatigue in the past few months.   Of note, she has changed to a gluten-free and low sugar diet in the past few months.  She has lost 10 pounds with the diet change but has not experienced any changes in her symptoms.  She is doing 10-minute of yoga per day but has been limiting high intensity exercise.    She does not drink alcohol.  She  works in [employer] and spends most of her days on the desk.    She has been evaluated by rheumatology and no underlying rheumatological disorder was diagnosed.  Prior investigations are listed below.  -CRP <3, ESR 6  - YING weakly positive at 1:160  - RF, CCP negative  -Hemoglobin A1c 5.1%  -Vitamin B12 493  -Triglyceride 55  -TSH 2.09  -Hepatitis C antibody and HIV negative    REVIEW OF SYSTEMS  All negative except for what indicated in the HPI. The following portions of the patient's history were reviewed and updated as appropriate: allergies, current medications, family history, medical history, surgical history, social history, and problem list.     PAST MEDICAL/SURGICAL HISTORY   Past Medical History:   Diagnosis Date     Other acne      Thyroid disease 11/17/2017     Unspecified contraceptive management     Nuva ring     Past Surgical History:   Procedure Laterality Date     ABDOMEN SURGERY  01/30/2021    Laproscopic Appendectomy     ENT SURGERY      wisdom teeth removed     EYE SURGERY  2006    Lasik     LAPAROSCOPIC APPENDECTOMY N/A 1/30/2021    Procedure: APPENDECTOMY, LAPAROSCOPIC;  Surgeon: Erum Merlos MD;  Location:  OR     NO HISTORY OF SURGERY          MEDICATIONS    Current Outpatient Medications:      albuterol (PROAIR HFA) 108 (90 Base) MCG/ACT inhaler, Inhale 2 puffs into the lungs every 6 hours for 30 days, Disp: 18 g, Rfl: 0     fexofenadine (ALLEGRA) 60 MG tablet, Take 1 tablet (60 mg) by mouth 2 times daily (Patient not taking: Reported on 7/19/2023), Disp: 60 tablet, Rfl: 1     gabapentin (NEURONTIN) 100 MG capsule, Take 1 capsule (100 mg) by mouth 3 times daily, Disp: 90 capsule, Rfl: 0     levothyroxine (SYNTHROID/LEVOTHROID) 50 MCG tablet, TAKE 1 TABLET BY MOUTH DAILY, Disp: 90 tablet, Rfl: 0     multivitamin w/minerals (THERA-VIT-M) tablet, Take 1 tablet by mouth daily, Disp: , Rfl:     ALLERGIES:  Allergies   Allergen Reactions     Codeine Anaphylaxis and Other (See Comments)      Molds & Smuts Other (See Comments), Anaphylaxis and Shortness Of Breath     Other reaction(s): Abnormal breathing     Cholesterol Support Other (See Comments)     Mold Difficulty breathing and Other (See Comments)     Seasonal Allergies Other (See Comments)     Amoxicillin-Pot Clavulanate Palpitations     Chest pain and palpitations       PHYSICAL EXAM    NEUROLOGICAL EXAMINATION  Mental status: normal.  Speech: normal.  Muscle strength: Normal muscle strength 5/5 proximally and distally in upper and lower limbs.  No weakness in wrist flexor, FDI, or ADM.  Sensation: Minimally decreased sensation to light touch and pinprick in the fourth and fifth digits of left more than right hand.  Intact sensation to light touch, pinprick, cold temperature, vibration, and joint proprioception in lower limbs.  Deep tendon reflexes: Normal reflexes in the upper and lower limbs including intact bilateral ankle reflexes  High arched feet: Absent  Hammertoes: Absent  Coordination: normal rapid alternating movements and finger to nose testing  Gait: normal.  Walk on heels: yes, bilaterally.  Walk on toes: yes, bilaterally.    Getting up from seated position without pushing on chair: yes.  Posture: normal.  Romberg: negative.    ASSESSMENT / PLAN  #1 Itching and hypersensitivity to touch in several parts of the body  #2 R/O ulnar neuropathy at the elbow  #3 Cramps and aching pain     Ms. Hart's neurological exam today is largely unremarkable other than minimally decreased sensation to light touch and pinprick in the forth and fifth digits of left > right hands. These findings together with her description of pain radiating down the elbow and medial forearm could suggest a mild ulnar neuropathy at the elbow. After discussion, we agreed to proceed with EMG/NCS to further evaluate for this.  I recommend that she avoid leaning on the elbow while sitting or driving, avoid prolonged elbow flexion, and use elbow pad.     We also  discussed that itching and hypersensitivity to touch are nonspecific symptoms.  Although these can be seen in patients with neuropathy, the variation in location, character, and frequency of her symptoms would point away from nerve damage/neuropathy.  The preserved sensation to all modalities in her feet and normal bilateral ankle reflexes are very reassuring and would point away from a length-dependent neuropathic process.  As she is getting EMG for ulnar neuropathy, we will also look for evidence of large-fiber peripheral neuropathy on the nerve conduction studies.  She has already had thorough work up that did not reveal any evidence of underlying rheumatological disorder or vitamin deficiency.  No further laboratory work-up is needed from neurological standpoint.    Lastly, there may be component of musculoskeletal contributing to her overall symptoms especially aching pain and cramps that seem to be mechanical.  I recommend that she start back on exercising regularly for 30 min/session.  Low intensity, stretching exercise would be helpful.  I will add labs to check for electrolyte abnormalities today.    Multiple questions were answered.  I spent a total of 60 minutes on the day of the visit for chart review, face-to-face visit, counseling/coordination of care, and documentation. Please see the note for further information on patient assessment and treatment.       PATIENT EDUCATION  Ready to learn, no apparent learning barriers were identified; learning preferences include listening.  Explained diagnosis and treatment plan; patient expressed understanding of the content.      Again, thank you for allowing me to participate in the care of your patient.        Sincerely,        Hussain Shah MD

## 2023-12-13 ENCOUNTER — OFFICE VISIT (OUTPATIENT)
Dept: FAMILY MEDICINE | Facility: CLINIC | Age: 43
End: 2023-12-13
Attending: INTERNAL MEDICINE
Payer: COMMERCIAL

## 2023-12-13 DIAGNOSIS — L71.9 ROSACEA: Primary | ICD-10-CM

## 2023-12-13 DIAGNOSIS — L30.4 INTERTRIGO: ICD-10-CM

## 2023-12-13 DIAGNOSIS — I78.1 TELANGIECTASIA: ICD-10-CM

## 2023-12-13 PROCEDURE — 99204 OFFICE O/P NEW MOD 45 MIN: CPT | Performed by: NURSE PRACTITIONER

## 2023-12-13 RX ORDER — HYDROCORTISONE 2.5 %
CREAM (GRAM) TOPICAL 2 TIMES DAILY
Qty: 30 G | Refills: 2 | Status: SHIPPED | OUTPATIENT
Start: 2023-12-13

## 2023-12-13 RX ORDER — MULTIPLE VITAMINS W/ MINERALS TAB 9MG-400MCG
1 TAB ORAL DAILY
COMMUNITY
Start: 2023-12-13

## 2023-12-13 RX ORDER — KETOCONAZOLE 20 MG/G
CREAM TOPICAL
Qty: 60 G | Refills: 11 | Status: SHIPPED | OUTPATIENT
Start: 2023-12-13

## 2023-12-13 ASSESSMENT — PAIN SCALES - GENERAL: PAINLEVEL: MILD PAIN (2)

## 2023-12-13 NOTE — PROGRESS NOTES
[facility] Dermatology Note  Encounter Date: Dec 13, 2023  Office Visit     Reviewed patients past medical history and pertinent chart review prior to patients visit today.     Dermatology Problem List:  Patient denies personal history of skin cancer or dysplastic nevi.    Positive family history of skin cancer in her mother, type unknown     ____________________________________________    Assessment & Plan:     Rosacea vs postinflamatory telangiectasias. -Patient request cosmetic referral to discuss laser removal techniques, referral placed.  -Try metronidazole 0.75% cream in case there is a rosacea component versus just superficial telangiectasias.  Patient will try this twice daily until her cosmetic referral appointment to see if there is any improvement.  Okay to moisturize over the top of the medication and wear SPF moisturizer in the morning.    # Intertrigo.  No signs of lichen sclerosis today.  Discussed that this is very common in the skin folds and is due to the areas being warm and moist.  Discussed techniques to ensure area stays as dry as possible but that this is not always possible.  I gave patient prescription for ketoconazole 2% cream as well as hydrocortisone 2.5% cream to apply 1-2 times daily until rash resolves. When rash is fully resolved okay to continue ketoconazole as much as needed to keep rash from flaring as it will tend to be recurrent for most people. Councled about use and side effects of thinning of the skin, striae, erythema, and worsening of rash with steroid use.        Follow-up in 1 month if intertrigo is not well-controlled, sooner as needed for new or worsening concerns    Cande Drake, CNP  Dermatology    _______________________________________    CC: Derm Problem (Rash on right cheek stated 11/22/Itchy spots on right thigh and back of leg-itching under the skin/Warts on hands that come and go/Intermittent sensitivity on hands with cold water)    HPI:  Ms.  Kaley Hart is a(n) 43 year old female who presents today as a new patient for several concerns. She had hand-foot-and-mouth in 2022 and the rash on her right cheek has been present ever since. It is always pink but never symptomatic. It is not changing.     She also has a recurrent rash in her groin that comes and goes. It is itchy and uncomfortable. Her mom has lichen sclerosus and she wonders if it is that    Patient is otherwise feeling well, without additional skin concerns.      Physical Exam:  SKIN: Genital skin, inguinal folds, face  - right cheek, superficial telangiecatasias with some very subtle inflammatory appearing papules within  -annular pink patch on right inguinal fold and generalized erythema of labia majora.     - No other lesions of concern on areas examined.     Medications:  Current Outpatient Medications   Medication    levothyroxine (SYNTHROID/LEVOTHROID) 50 MCG tablet    multivitamin w/minerals (MULTIVITAMINS W/MINERALS) tablet     No current facility-administered medications for this visit.      Past Medical History:   Patient Active Problem List   Diagnosis    Contraceptive management    Other acne    Hematuria    Hypothyroidism due to Hashimoto's thyroiditis    Cold sore    Female stress incontinence    Hashimoto's thyroiditis    Raynaud's disease    Atypical chest pain    Capillary hemangioma of skin     Past Medical History:   Diagnosis Date    Other acne     Thyroid disease 11/17/2017    Unspecified contraceptive management     Nuva ring       CC Savita Jerome MD  [address] [place] [address] on close of this encounter.

## 2023-12-13 NOTE — LETTER
12/13/2023         RE: Kaley Hart  [address]  [place] [address]        Dear Colleague,    Thank you for referring your patient, Kaley Hart, to the [facility]. Please see a copy of my visit note below.    [facility] Dermatology Note  Encounter Date: Dec 13, 2023  Office Visit     Reviewed patients past medical history and pertinent chart review prior to patients visit today.     Dermatology Problem List:  Patient denies personal history of skin cancer or dysplastic nevi.    Positive family history of skin cancer in her mother, type unknown     ____________________________________________    Assessment & Plan:     Rosacea vs postinflamatory telangiectasias. -Patient request cosmetic referral to discuss laser removal techniques, referral placed.  -Try metronidazole 0.75% cream in case there is a rosacea component versus just superficial telangiectasias.  Patient will try this twice daily until her cosmetic referral appointment to see if there is any improvement.  Okay to moisturize over the top of the medication and wear SPF moisturizer in the morning.    # Intertrigo.  No signs of lichen sclerosis today.  Discussed that this is very common in the skin folds and is due to the areas being warm and moist.  Discussed techniques to ensure area stays as dry as possible but that this is not always possible.  I gave patient prescription for ketoconazole 2% cream as well as hydrocortisone 2.5% cream to apply 1-2 times daily until rash resolves. When rash is fully resolved okay to continue ketoconazole as much as needed to keep rash from flaring as it will tend to be recurrent for most people. Councled about use and side effects of thinning of the skin, striae, erythema, and worsening of rash with steroid use.        Follow-up in 1 month if intertrigo is not well-controlled, sooner as needed for new or worsening concerns    Cande Drake, CNP  Dermatology     _______________________________________    CC: Derm Problem (Rash on right cheek stated 11/22/Itchy spots on right thigh and back of leg-itching under the skin/Warts on hands that come and go/Intermittent sensitivity on hands with cold water)    HPI:  Ms. Kaley Hart is a(n) 43 year old female who presents today as a new patient for several concerns. She had hand-foot-and-mouth in 2022 and the rash on her right cheek has been present ever since. It is always pink but never symptomatic. It is not changing.     She also has a recurrent rash in her groin that comes and goes. It is itchy and uncomfortable. Her mom has lichen sclerosus and she wonders if it is that    Patient is otherwise feeling well, without additional skin concerns.      Physical Exam:  SKIN: Genital skin, inguinal folds, face  - right cheek, superficial telangiecatasias with some very subtle inflammatory appearing papules within  -annular pink patch on right inguinal fold and generalized erythema of labia majora.     - No other lesions of concern on areas examined.     Medications:  Current Outpatient Medications   Medication     levothyroxine (SYNTHROID/LEVOTHROID) 50 MCG tablet     multivitamin w/minerals (MULTIVITAMINS W/MINERALS) tablet     No current facility-administered medications for this visit.      Past Medical History:   Patient Active Problem List   Diagnosis     Contraceptive management     Other acne     Hematuria     Hypothyroidism due to Hashimoto's thyroiditis     Cold sore     Female stress incontinence     Hashimoto's thyroiditis     Raynaud's disease     Atypical chest pain     Capillary hemangioma of skin     Past Medical History:   Diagnosis Date     Other acne      Thyroid disease 11/17/2017     Unspecified contraceptive management     Nuva ring       CC Savita Jerome MD  [address] [place] [address] on close of this encounter.       Again, thank you for allowing me to participate in the care of  your patient.        Sincerely,        LUIS ANGEL Villaseñor CNP

## 2023-12-13 NOTE — PATIENT INSTRUCTIONS
Rosacea versus blood vessels called talengiectasias. Use the metronidazole cream twice daily until your follow up with Dr. Mendez In [place] to consider Pulse dye laser if it is not improved.     Intertrigo    You have been prescribed two creams. Ketoconazole is the anti-yeast cream that you can use to treat and to prevent the rash. Hydrocortisone 2.5% cream is a topical steroid cream and is used only when you have an active rash.     Please use the ketoconazole and hydrocortisone cream both once or twice daily when you have a rash. Once the rash is resolved then stop using the hydrocortisone cream. Continue to use the ketoconazole every day or okay to decrease to a couple times weekly to prevent the rash from recurring.     Try to keep the area as dry as possible. Pat dry and make sure the skin is dry after bathing before applying the creams. Some people like to use a hair dryer on a cold setting to dry the area before applying their cream.

## 2023-12-18 ENCOUNTER — MYC REFILL (OUTPATIENT)
Dept: FAMILY MEDICINE | Facility: CLINIC | Age: 43
End: 2023-12-18
Payer: COMMERCIAL

## 2023-12-18 DIAGNOSIS — E06.3 HYPOTHYROIDISM DUE TO HASHIMOTO'S THYROIDITIS: ICD-10-CM

## 2023-12-18 RX ORDER — LEVOTHYROXINE SODIUM 50 UG/1
TABLET ORAL
Qty: 90 TABLET | Refills: 1 | Status: SHIPPED | OUTPATIENT
Start: 2023-12-18 | End: 2024-07-03

## 2023-12-21 ENCOUNTER — MYC MEDICAL ADVICE (OUTPATIENT)
Dept: FAMILY MEDICINE | Facility: CLINIC | Age: 43
End: 2023-12-21
Payer: COMMERCIAL

## 2024-01-24 ENCOUNTER — LAB REQUISITION (OUTPATIENT)
Dept: LAB | Facility: CLINIC | Age: 44
End: 2024-01-24
Payer: COMMERCIAL

## 2024-01-24 DIAGNOSIS — Z01.419 ENCOUNTER FOR GYNECOLOGICAL EXAMINATION (GENERAL) (ROUTINE) WITHOUT ABNORMAL FINDINGS: ICD-10-CM

## 2024-01-24 PROCEDURE — 87624 HPV HI-RISK TYP POOLED RSLT: CPT | Mod: ORL | Performed by: PHYSICIAN ASSISTANT

## 2024-01-24 PROCEDURE — G0145 SCR C/V CYTO,THINLAYER,RESCR: HCPCS | Mod: ORL | Performed by: PHYSICIAN ASSISTANT

## 2024-01-26 ENCOUNTER — TELEPHONE (OUTPATIENT)
Dept: FAMILY MEDICINE | Facility: CLINIC | Age: 44
End: 2024-01-26

## 2024-01-26 NOTE — TELEPHONE ENCOUNTER
Pt called the clinic stating that she woke up with a sore throat, and has been exposed to strep recently.     Advised pt to complete an E-Visit for this concern. She verbalized understanding and stated she will complete this now.     Thank you,  Claudia Calloway RN

## 2024-01-29 LAB
BKR LAB AP GYN ADEQUACY: NORMAL
BKR LAB AP GYN INTERPRETATION: NORMAL
BKR LAB AP HPV REFLEX: NORMAL
BKR LAB AP LMP: NORMAL
BKR LAB AP PREVIOUS ABNL DX: NORMAL
BKR LAB AP PREVIOUS ABNORMAL: NORMAL
PATH REPORT.COMMENTS IMP SPEC: NORMAL
PATH REPORT.COMMENTS IMP SPEC: NORMAL
PATH REPORT.RELEVANT HX SPEC: NORMAL

## 2024-01-31 LAB
HUMAN PAPILLOMA VIRUS 16 DNA: NEGATIVE
HUMAN PAPILLOMA VIRUS 18 DNA: NEGATIVE
HUMAN PAPILLOMA VIRUS FINAL DIAGNOSIS: NORMAL
HUMAN PAPILLOMA VIRUS OTHER HR: NEGATIVE

## 2024-02-04 ENCOUNTER — HEALTH MAINTENANCE LETTER (OUTPATIENT)
Age: 44
End: 2024-02-04

## 2024-02-15 ENCOUNTER — OFFICE VISIT (OUTPATIENT)
Dept: NEUROLOGY | Facility: CLINIC | Age: 44
End: 2024-02-15
Payer: COMMERCIAL

## 2024-02-15 DIAGNOSIS — G56.22 ULNAR NEUROPATHY AT ELBOW OF LEFT UPPER EXTREMITY: ICD-10-CM

## 2024-02-15 DIAGNOSIS — R20.2 PARESTHESIA: ICD-10-CM

## 2024-02-15 PROCEDURE — 95910 NRV CNDJ TEST 7-8 STUDIES: CPT | Performed by: PSYCHIATRY & NEUROLOGY

## 2024-02-15 NOTE — PROGRESS NOTES
[facility]  Electrodiagnostic Laboratory                 Department of Neurology                                                                                                         Test Date:  2/15/2024    Patient: Kaley Hart : 1980 Physician: Klever Flores MD   Sex: Female AGE: 43 year Ref Phys: Hussain Shah MD   ID#: [id number]   Technician: Tricia Bah     History and Examination:    43-year-old woman with chief complaint of numbness and tingling at digits 4 and 5 of the left hand.  She also has a generalized itching and hypersensitivity to touch.  EMG was requested to evaluate for left ulnar neuropathy, and/or large fiber polyneuropathy.    Techniques:    Motor and sensory nerve conduction studies were done with surface recording electrodes. EMG was done with a concentric needle electrode.     Results:    Left fibular (EDB), and median (APB) motor nerve conduction studies were normal.  Left ulnar (ADM) motor nerve conduction study showed normal distal latency and CMAP amplitudes, normal conduction velocity at the forearm, and attenuated conduction velocity across the elbow.  Left median, radial, ulnar, and sural antidromic sensory nerve conduction studies were normal.  Needle EMG of the left FDI was normal.    Interpretation:    Abnormal study.  There is electrodiagnostic evidence of a mild left ulnar neuropathy at the elbow (cubital tunnel syndrome).  There is no electrodiagnostic evidence of large fiber polyneuropathy from this study.      ___________________________  Klever Flores MD        Nerve Conduction Studies  Motor Sites      Latency Amplitude Neg. Amp Diff Segment Distance Velocity Neg. Dur Neg Area Diff Temperature Comment   Site (ms) Norm (mV) Norm (%)  cm m/s Norm (ms) (%) ( C)    Left Fibular (EDB) Motor   Ankle 4.9  < 6.0 3.6  > 2.5  Ankle-EDB 7.8   6.1  33.4    Bel Fib Head 11.0 - 3.0 - -17 Bel Fib Head-Ankle  28.7 47  > 38 6.3 -19 33.5    Pop Fossa 12.4 - 3.6 - 20 Pop Fossa-Bel Fib Head 9 64  > 38 6.9 45 33.6    Left Median (APB) Motor   Wrist 3.3  < 4.4 11.4  > 5.0  Wrist-APB 8   5.2  32.6    Elbow 6.8 - 11.3  > 5.0 -1 Elbow-Wrist 20.5 59  > 48 5.2 -1 32.6    Left Ulnar (ADM) Motor   Wrist 2.9  < 3.5 12.7  > 5.0  Wrist-ADM 7.8   4.9  31.9    Bel Elbow 6.3 - 11.3 - -11 Bel Elbow-Wrist 19.7 58  > 48 5.9 -3 31.8    Abv Elbow 8.8 - 10.4 - -8 Abv Elbow-Bel Elbow 9.8 39  > 48 6.1 4 31.7      Sensory Sites      Onset Lat Peak Lat Amp (O-P) Amp (P-P) Segment Distance Velocity Temperature Comment   Site ms (ms)  V Norm ( V)  cm m/s Norm ( C)    Left Median Sensory   Wrist-Dig II 2.3 3.1 68  > 10 109 Wrist-Dig II 14 61  > 48 31.4    Left Radial Sensory   Forearm-Wrist 1.65 2.2 28  > 15 66 Forearm-Wrist 10.5 64 - 31.4    Left Sural Sensory   Calf-Lat Mall 2.3 3.0 10  > 5 26 Calf-Lat Mall 14 61  > 38 32.8    Left Ulnar Sensory   Wrist-Dig V 2.2 3.1 30  > 8 60 Wrist-Dig V 12.5 57  > 48 31.4            NCS Waveforms:    Motor           Sensory                  Ultrasound Images:

## 2024-02-15 NOTE — LETTER
2/15/2024       RE: Kaley Hart  [address]  [place] [address]     Dear Colleague,    Thank you for referring your patient, Kaley Hart, to the [facility] [place] at [facility]. Please see a copy of my visit note below.                            [facility]  Electrodiagnostic Laboratory                 Department of Neurology                                                                                                         Test Date:  2/15/2024    Patient: Kaley Hart : 1980 Physician: Klever Flores MD   Sex: Female AGE: 43 year Ref Phys: Hussain Shah MD   ID#: [id number]   Technician: Tricia Bah     History and Examination:    43-year-old woman with chief complaint of numbness and tingling at digits 4 and 5 of the left hand.  She also has a generalized itching and hypersensitivity to touch.  EMG was requested to evaluate for left ulnar neuropathy, and/or large fiber polyneuropathy.    Techniques:    Motor and sensory nerve conduction studies were done with surface recording electrodes. EMG was done with a concentric needle electrode.     Results:    Left fibular (EDB), and median (APB) motor nerve conduction studies were normal.  Left ulnar (ADM) motor nerve conduction study showed normal distal latency and CMAP amplitudes, normal conduction velocity at the forearm, and attenuated conduction velocity across the elbow.  Left median, radial, ulnar, and sural antidromic sensory nerve conduction studies were normal.  Needle EMG of the left FDI was normal.    Interpretation:    Abnormal study.  There is electrodiagnostic evidence of a mild left ulnar neuropathy at the elbow (cubital tunnel syndrome).  There is no electrodiagnostic evidence of large fiber polyneuropathy from this study.      ___________________________  Klever Flores MD        Nerve Conduction  Studies  Motor Sites      Latency Amplitude Neg. Amp Diff Segment Distance Velocity Neg. Dur Neg Area Diff Temperature Comment   Site (ms) Norm (mV) Norm (%)  cm m/s Norm (ms) (%) ( C)    Left Fibular (EDB) Motor   Ankle 4.9  < 6.0 3.6  > 2.5  Ankle-EDB 7.8   6.1  33.4    Bel Fib Head 11.0 - 3.0 - -17 Bel Fib Head-Ankle 28.7 47  > 38 6.3 -19 33.5    Pop Fossa 12.4 - 3.6 - 20 Pop Fossa-Bel Fib Head 9 64  > 38 6.9 45 33.6    Left Median (APB) Motor   Wrist 3.3  < 4.4 11.4  > 5.0  Wrist-APB 8   5.2  32.6    Elbow 6.8 - 11.3  > 5.0 -1 Elbow-Wrist 20.5 59  > 48 5.2 -1 32.6    Left Ulnar (ADM) Motor   Wrist 2.9  < 3.5 12.7  > 5.0  Wrist-ADM 7.8   4.9  31.9    Bel Elbow 6.3 - 11.3 - -11 Bel Elbow-Wrist 19.7 58  > 48 5.9 -3 31.8    Abv Elbow 8.8 - 10.4 - -8 Abv Elbow-Bel Elbow 9.8 39  > 48 6.1 4 31.7      Sensory Sites      Onset Lat Peak Lat Amp (O-P) Amp (P-P) Segment Distance Velocity Temperature Comment   Site ms (ms)  V Norm ( V)  cm m/s Norm ( C)    Left Median Sensory   Wrist-Dig II 2.3 3.1 68  > 10 109 Wrist-Dig II 14 61  > 48 31.4    Left Radial Sensory   Forearm-Wrist 1.65 2.2 28  > 15 66 Forearm-Wrist 10.5 64 - 31.4    Left Sural Sensory   Calf-Lat Mall 2.3 3.0 10  > 5 26 Calf-Lat Mall 14 61  > 38 32.8    Left Ulnar Sensory   Wrist-Dig V 2.2 3.1 30  > 8 60 Wrist-Dig V 12.5 57  > 48 31.4            NCS Waveforms:    Motor           Sensory                  Ultrasound Images:        Again, thank you for allowing me to participate in the care of your patient.      Sincerely,    Klever Flores MD

## 2024-02-20 ENCOUNTER — TELEPHONE (OUTPATIENT)
Dept: NEUROLOGY | Facility: CLINIC | Age: 44
End: 2024-02-20
Payer: COMMERCIAL

## 2024-02-20 NOTE — TELEPHONE ENCOUNTER
Left Voicemail (1st Attempt) and Sent Mychart (1st Attempt) for the patient to call back and schedule the following:    Appointment type: Return Neurology   Provider:    Return date: first Available- Virtual    Specialty phone number: [phone]  Additional appointment(s) needed: na  Additonal Notes:   Please schedule a video follow-up appointment first available to discuss EMG results.     Aileen Boykin on 2/20/2024 at 11:41 AM

## 2024-02-22 ENCOUNTER — TELEPHONE (OUTPATIENT)
Dept: NEUROLOGY | Facility: CLINIC | Age: 44
End: 2024-02-22
Payer: COMMERCIAL

## 2024-02-22 NOTE — TELEPHONE ENCOUNTER
Left Voicemail (2nd Attempt) for the patient to call back and schedule the following:    Appointment type: Return Neurology   Provider: Dr. Shah  Return date: First Available - virtual    Specialty phone number: [phone]  Additional appointment(s) needed: n/a  Additonal Notes: Dr. Shah appointment request     2nd attempt to contact: Voice mail -full, send letter     Aileen Boykin on 2/22/2024 at 8:21 AM

## 2024-04-24 ENCOUNTER — VIRTUAL VISIT (OUTPATIENT)
Dept: NEUROLOGY | Facility: CLINIC | Age: 44
End: 2024-04-24
Payer: COMMERCIAL

## 2024-04-24 DIAGNOSIS — G56.22 ULNAR NEUROPATHY AT ELBOW OF LEFT UPPER EXTREMITY: Primary | ICD-10-CM

## 2024-04-24 DIAGNOSIS — R20.2 PARESTHESIA: ICD-10-CM

## 2024-04-24 DIAGNOSIS — R25.2 CRAMP OF LIMB: ICD-10-CM

## 2024-04-24 PROBLEM — R20.3: Status: ACTIVE | Noted: 2023-07-01

## 2024-04-24 PROBLEM — R53.1 GENERALIZED WEAKNESS: Status: ACTIVE | Noted: 2023-07-01

## 2024-04-24 PROBLEM — B00.2 ORAL HERPES SIMPLEX INFECTION: Status: ACTIVE | Noted: 2024-01-23

## 2024-04-24 PROBLEM — R53.83 TIREDNESS: Status: ACTIVE | Noted: 2023-04-01

## 2024-04-24 PROCEDURE — 99213 OFFICE O/P EST LOW 20 MIN: CPT | Mod: 95 | Performed by: STUDENT IN AN ORGANIZED HEALTH CARE EDUCATION/TRAINING PROGRAM

## 2024-04-24 RX ORDER — FEXOFENADINE HCL 60 MG/1
180 TABLET, FILM COATED ORAL 2 TIMES DAILY
COMMUNITY
End: 2024-06-26

## 2024-04-24 NOTE — LETTER
4/24/2024         RE: Kaley Hart  [address]  [place] [address]        Dear Colleague,    Thank you for referring your patient, Kaley Hart, to the [facility] [place]. Please see a copy of my visit note below.    CHIEF COMPLAINT / REASON FOR VISIT  Ms. Hart returned to discuss the results of the tests and evaluations.  Consult conducted via real-time audio/video technology by Hussain Shah M.D. in [facility] neurology clinic to the patient at home.     - EMG/NCS 2/15/2024: There is electrodiagnostic evidence of a mild left ulnar neuropathy at the elbow (cubital tunnel syndrome).  There is no electrodiagnostic evidence of large fiber polyneuropathy from this study.     She continues to experience intermittent aches in the hands, finger joints, and knuckles.  She feels that these may be related to allergy and has been trying to avoid trigger and modify her diet.  She sometimes gets tingling and ache in the last 3 digits of both hands.  She has not tried an elbow pad but has been avoiding leaning on her elbows.  There has been no new symptoms.    The following portions of the patient's history were reviewed and updated as appropriate: allergies, current medications, family history, medical history, surgical history, social history, and problem list.       ASSESSMENT / PLAN   #1  Bilateral ulnar neuropathy at the elbow  #2  Nonspecific aching pain in both hands and fingers    We discussed that the ulnar neuropathy at the elbow can explain the tingling sensation in the fourth and fifth fingers but would not explain the aching pain in other parts of the hands.  Description of the symptoms continue to be more consistent with musculoskeletal/arthralgia.  She has already had extensive workup with rheumatology without definite evidence of underlying connective tissue disease that warrant immunosuppressive therapy.  At this point, I recommend  that she continue to observe her symptoms.  Avoid triggers as much as she can.  She can reach out to me if she were to develop new or worsening symptoms.    I spent a total of 20 minutes on the day of the visit for chart review, video visit, counseling/coordination of care, and documentation. Please see the note for further information on patient assessment and treatment.       PATIENT EDUCATION  Ready to learn, no apparent learning barriers were identified; learning preferences include listening.  Explained diagnosis and treatment plan; patient expressed understanding of the content.      Again, thank you for allowing me to participate in the care of your patient.        Sincerely,        Hussain Shah MD

## 2024-04-24 NOTE — PROGRESS NOTES
CHIEF COMPLAINT / REASON FOR VISIT  Ms. Hart returned to discuss the results of the tests and evaluations.  Consult conducted via real-time audio/video technology by Hussain Shah M.D. in [facility] neurology clinic to the patient at home.     - EMG/NCS 2/15/2024: There is electrodiagnostic evidence of a mild left ulnar neuropathy at the elbow (cubital tunnel syndrome).  There is no electrodiagnostic evidence of large fiber polyneuropathy from this study.     She continues to experience intermittent aches in the hands, finger joints, and knuckles.  She feels that these may be related to allergy and has been trying to avoid trigger and modify her diet.  She sometimes gets tingling and ache in the last 3 digits of both hands.  She has not tried an elbow pad but has been avoiding leaning on her elbows.  There has been no new symptoms.    The following portions of the patient's history were reviewed and updated as appropriate: allergies, current medications, family history, medical history, surgical history, social history, and problem list.       ASSESSMENT / PLAN   #1  Bilateral ulnar neuropathy at the elbow  #2  Nonspecific aching pain in both hands and fingers    We discussed that the ulnar neuropathy at the elbow can explain the tingling sensation in the fourth and fifth fingers but would not explain the aching pain in other parts of the hands.  Description of the symptoms continue to be more consistent with musculoskeletal/arthralgia.  She has already had extensive workup with rheumatology without definite evidence of underlying connective tissue disease that warrant immunosuppressive therapy.  At this point, I recommend that she continue to observe her symptoms.  Avoid triggers as much as she can.  She can reach out to me if she were to develop new or worsening symptoms.    I spent a total of 20 minutes on the day of the visit for chart review, video visit,  counseling/coordination of care, and documentation. Please see the note for further information on patient assessment and treatment.       PATIENT EDUCATION  Ready to learn, no apparent learning barriers were identified; learning preferences include listening.  Explained diagnosis and treatment plan; patient expressed understanding of the content.

## 2024-05-30 DIAGNOSIS — E06.3 HYPOTHYROIDISM DUE TO HASHIMOTO'S THYROIDITIS: ICD-10-CM

## 2024-05-30 RX ORDER — LEVOTHYROXINE SODIUM 50 UG/1
TABLET ORAL
Qty: 90 TABLET | Refills: 1 | OUTPATIENT
Start: 2024-05-30

## 2024-06-21 NOTE — PROGRESS NOTES
[facility] Dermatology Note  Encounter Date: Jun 26, 2024  Office Visit     Dermatology Problem List:  0. NUB, R chin  1. Rosacea vs post-inflammatory telangiectasias  - Metrocream BID  2. Intertrigo  - hydrocortisone 2.5% BID    ____________________________________________    Assessment & Plan:    # Neoplasm of unspecified behavior of the skin (D49.2) on the R chin. The differential diagnosis includes nevus or BCC .   - Shave biopsy today. See procedure section. Pt offered biopsy and declines.   - 4 mm fleshy papule with telangiectasias    # Telangiectasias on the cheeks, greater on R cheek. Consistent with Rosacea. On metrocream.   - Reviewed PDL treatment options and recommended a series of at least 3 treatments.  Reviewed the risks of laser use including dyspigmentation, scarring, blistering and need to avoid tan prior. Reviewed this would be considered cosmetic.   - PDL today. See procedure section.    # María, forehead and R cheek  - Reviewed extraction option. Quote provided.          Procedures Performed:   PDL today. See separate encounter.   - Shave biopsy procedure note, location(s): R chin. After discussion of benefits and risks including but not limited to bleeding, infection, scar, incomplete removal, recurrence, and non-diagnostic biopsy, verbal consent and photographs were obtained. The area was cleaned with isopropyl alcohol. 0.5mL of 1% lidocaine with epinephrine was injected to obtain adequate anesthesia of lesion(s). Shave biopsy at site(s) performed. Hemostasis was achieved with aluminium chloride. Petrolatum ointment and a sterile dressing were applied. The patient tolerated the procedure and no complications were noted. The patient was provided with verbal and written post care instructions.       Follow-up: for PDL and milia extraction.     Staff and Scribe:     Scribe Disclosure:   IAnna, am serving as a scribe to document services personally performed by Chantel  MD Andrea based on data collection and the provider's statements to me.     Provider Disclosure:   The documentation recorded by the scribe accurately reflects the services I personally performed and the decisions made by me.    Chantel Mendez MD    Department of Dermatology  [facility]: Phone: [phone], Fax:[phone]  [facility]: Phone: [phone], Fax: [phone]   ____________________________________________    CC: Laser Treatment (PDL right cheek - 1 area $235)    HPI:  Ms. Kaley Hart is a(n) 43 year old female who presents today as a new patient for cosmetic consult.    Referred by LUIS ANGEL Gallegos CNP for Telangiectasia.    Today, patient reports being bothered by redness on her cheeks, more so on the R cheek. Reports tiny bumps on face. Would like a mole on her chin looked at.       Patient is otherwise feeling well, without additional skin concerns.    Labs Reviewed:  N/A    Physical Exam:  Vitals: There were no vitals taken for this visit.  SKIN: Focused examination of face was performed.  - Scattered telangiectasias over the cheeks.   - There are white 1-2 mm papules on the forehead and R cheek.   - There is a 4 mm fleshy papule with telangiectasias on the R chin.  - No other lesions of concern on areas examined.     Medications:  Current Outpatient Medications   Medication Sig Dispense Refill    hydrocortisone 2.5 % cream Apply topically 2 times daily When rash is flared only then stop 30 g 2    ketoconazole (NIZORAL) 2 % external cream Apply daily to skin folds where rash is prone 60 g 11    levothyroxine (SYNTHROID/LEVOTHROID) 50 MCG tablet TAKE 1 TABLET BY MOUTH DAILY 90 tablet 1    metroNIDAZOLE (METROCREAM) 0.75 % external cream Apply topically 2 times daily To cheek 45 g 11    multivitamin w/minerals (MULTIVITAMINS W/MINERALS) tablet Take 1 tablet by  mouth daily      fexofenadine (ALLEGRA) 60 MG tablet Take 180 mg by mouth 2 times daily       No current facility-administered medications for this visit.      Past Medical History:   Patient Active Problem List   Diagnosis    Contraceptive management    Other acne    Hematuria    Hypothyroidism due to Hashimoto's thyroiditis    Cold sore    Female stress incontinence    Hashimoto's thyroiditis    Raynaud's disease    Atypical chest pain    Capillary hemangioma of skin    Cramping of hands    Generalized weakness    Tiredness    Touch sensitivity increased    Oral herpes simplex infection     Past Medical History:   Diagnosis Date    Other acne     Thyroid disease 11/17/2017    Unspecified contraceptive management     Nuva ring        HAYLEY Drake, APRN CNP  [address] [facility] [place] [address] on close of this encounter.    ____________________________________________________________________________________________________

## 2024-06-26 ENCOUNTER — OFFICE VISIT (OUTPATIENT)
Dept: DERMATOLOGY | Facility: CLINIC | Age: 44
End: 2024-06-26
Payer: COMMERCIAL

## 2024-06-26 ENCOUNTER — OFFICE VISIT (OUTPATIENT)
Dept: DERMATOLOGY | Facility: CLINIC | Age: 44
End: 2024-06-26
Attending: NURSE PRACTITIONER
Payer: COMMERCIAL

## 2024-06-26 DIAGNOSIS — I78.1 TELANGIECTASIA: ICD-10-CM

## 2024-06-26 DIAGNOSIS — D49.2 NEOPLASM OF UNSPECIFIED BEHAVIOR OF BONE, SOFT TISSUE, AND SKIN: Primary | ICD-10-CM

## 2024-06-26 PROCEDURE — 99213 OFFICE O/P EST LOW 20 MIN: CPT | Mod: 25 | Performed by: DERMATOLOGY

## 2024-06-26 PROCEDURE — 11102 TANGNTL BX SKIN SINGLE LES: CPT | Performed by: DERMATOLOGY

## 2024-06-26 PROCEDURE — 88305 TISSUE EXAM BY PATHOLOGIST: CPT | Mod: TC | Performed by: DERMATOLOGY

## 2024-06-26 PROCEDURE — 88305 TISSUE EXAM BY PATHOLOGIST: CPT | Mod: 26 | Performed by: DERMATOLOGY

## 2024-06-26 PROCEDURE — 96999 UNLISTED SPEC DERM SVC/PX: CPT | Performed by: DERMATOLOGY

## 2024-06-26 ASSESSMENT — PAIN SCALES - GENERAL: PAINLEVEL: NO PAIN (0)

## 2024-06-26 NOTE — NURSING NOTE
Dermatology Rooming Note    Kaley Hart's goals for this visit include:   Chief Complaint   Patient presents with    Laser Treatment     PDL right cheek - 1 area $235        - Does patient need refills? N/A    - New Patient    Jessica Alejandre    Dermatology Laser Intake Checklist:  History of psoriasis: No  History of recent tan, indoor or outdoor tanning/vacation or other sun exposure: No  History of vitiligo: No  Family history of vitiligo: No  Recent other cosmetic procedure(microderm abrasion/peel/hair removal/facial etc): No  History of HSV: No  Did the patient start valtrex: N/A  For genital laser hair removal patient only: Is there a history of genital warts or condyloma: N/A  Tattoo in the area to be treated: No  Is patient using hydroquinone: No  Retinoids and other acne medications stopped for 2 weeks: No  Has the patient had accutane in the last 6-12 months: No  Pregnant or breastfeeding: No  History of skin cancer in area planned for treatment: No  History of treatment with gold: No  Changes in medical history: No  Photos obtained: Yes  Does the patient smoke: No  Is the patient on ibuprofen/aspirin/plavix/coumadin/other blood thinner: No  If patient is taking narcotic or diazepam(valium)-does patient have : No  There were no vitals taken for this visit.    Jessica Alejandre

## 2024-06-26 NOTE — PROGRESS NOTES
Laser- VBeam(Pulsed Dye Laser) Procedure Note: Cosmetic      Dermatology Problem List:  0. NUB, R chin  1. Rosacea vs post-inflammatory telangiectasias  - Metrocream BID  2. Intertrigo  - hydrocortisone 2.5% BID      Procedure Date: 2024    Attending Staff Surgeon: Dr Chantel Mendez    Resident Surgeon: n/a     Assistant: Breonna Mancilla CMA     Operating Room Data:     Surgery/Procedure Date:    SAME     Pre-operative Diagnosis:   Telangectasias  Location: R cheek  Number of lesions: 1    Operation/Procedure    Vbeam pulsed dye laser treatment#: 1      Post-operative Diagnosis:  SAME    Laser Settings:  Energy:7 J/cm2  Spot size:10mm  Pulse width:  6 mS (0.45 thru 40 mS)  Dynamic cooling spray settin mS  Dynamic cooling device delay:  20 mS      Fotofinder photos: No    Anesthesia:  None    Description of Operation/Procedure:   The nature and purpose of the procedure, associated risks, possible consequences, complications and alternative methods of treatment were explained in detail, this includes but is not limited to hyperpigmentation, hypopigmentation, scarring, bruising, hair loss pain/discomfort, eye injury,   and blister. We reviewed that the outcome could be any of the following: no improvement, slight improvement or change in skin color & texture, the skin might be permanently lighter or darker, and though uncommon, superficial scarring may occur.  Multiple treatments may be recommended.     A photo and operative consent were obtained. Time-out was performed.The patient was positioned to optimally expose the area treated. Protective eyewear was worn by the patient and goggles on all personnel in the treatment room. The patient confirmed the site to be treated. The laser energy output was verified by meter reading.      The clinically evident lesion(s) was/were treated with Jenn Vbeam pulsed dye laser (595 nm) beam as above. A total of 144 pulses were used. The patient tolerated the  procedure well and no complications were noted. Post operative instructions were provided. The total laser operation and preparation time was <15 minutes.  The patient was counseled to call immediately for any issues and read the after visit summary for emergency contact information. The patient was counseled that mychart messaging response may be delayed by several days, therefore, phone is preferred for emergency issues.     The patient will follow-up in 4-12 weeks for milia and PDL    The patient will pay the cosmetic fee today.         Staff Involved:  Scribe/Staff  Scribe Disclosure:   I, Anna Reno, am serving as a scribe to document services personally performed by Chantel Mendez MD based on data collection and the provider's statements to me.     Provider Disclosure:   The documentation recorded by the scribe accurately reflects the services I personally performed and the decisions made by me.    Chantel Mendez MD    Department of Dermatology  [facility]: Phone: [phone], Fax:[phone]  [facility]: Phone: [phone], Fax: [phone]

## 2024-06-26 NOTE — LETTER
6/26/2024       RE: Kaley Hart  [address]  [place] [address]     Dear Colleague,    Thank you for referring your patient, Kaley Hart, to the [facility]ERMATOLOGY [facility] [place] at [facility]. Please see a copy of my visit note below.      [facility] Dermatology Note  Encounter Date: Jun 26, 2024  Office Visit     Dermatology Problem List:  0. NUB, R chin  1. Rosacea vs post-inflammatory telangiectasias  - Metrocream BID  2. Intertrigo  - hydrocortisone 2.5% BID    ____________________________________________    Assessment & Plan:    # Neoplasm of unspecified behavior of the skin (D49.2) on the R chin. The differential diagnosis includes nevus or BCC .   - Shave biopsy today. See procedure section. Pt offered biopsy and declines.   - 4 mm fleshy papule with telangiectasias    # Telangiectasias on the cheeks, greater on R cheek. Consistent with Rosacea. On metrocream.   - Reviewed PDL treatment options and recommended a series of at least 3 treatments.  Reviewed the risks of laser use including dyspigmentation, scarring, blistering and need to avoid tan prior. Reviewed this would be considered cosmetic.   - PDL today. See procedure section.    # María, forehead and R cheek  - Reviewed extraction option. Quote provided.          Procedures Performed:   PDL today. See separate encounter.   - Shave biopsy procedure note, location(s): R chin. After discussion of benefits and risks including but not limited to bleeding, infection, scar, incomplete removal, recurrence, and non-diagnostic biopsy, verbal consent and photographs were obtained. The area was cleaned with isopropyl alcohol. 0.5mL of 1% lidocaine with epinephrine was injected to obtain adequate anesthesia of lesion(s). Shave biopsy at site(s) performed. Hemostasis was achieved with aluminium chloride. Petrolatum ointment and a sterile dressing were applied. The  patient tolerated the procedure and no complications were noted. The patient was provided with verbal and written post care instructions.       Follow-up: for PDL and milia extraction.     Staff and Scribe:     Scribe Disclosure:   I, Anna Reno, am serving as a scribe to document services personally performed by Chantel Mendez MD based on data collection and the provider's statements to me.     Provider Disclosure:   The documentation recorded by the scribe accurately reflects the services I personally performed and the decisions made by me.    Chantel Mendez MD    Department of Dermatology  [facility]: Phone: [phone], Fax:[phone]  [facility]: Phone: [phone], Fax: [phone]   ____________________________________________    CC: Laser Treatment (PDL right cheek - 1 area $235)    HPI:  Ms. Kaley Hart is a(n) 43 year old female who presents today as a new patient for cosmetic consult.    Referred by LUIS ANGEL Gallegos CNP for Telangiectasia.    Today, patient reports being bothered by redness on her cheeks, more so on the R cheek. Reports tiny bumps on face. Would like a mole on her chin looked at.       Patient is otherwise feeling well, without additional skin concerns.    Labs Reviewed:  N/A    Physical Exam:  Vitals: There were no vitals taken for this visit.  SKIN: Focused examination of face was performed.  - Scattered telangiectasias over the cheeks.   - There are white 1-2 mm papules on the forehead and R cheek.   - There is a 4 mm fleshy papule with telangiectasias on the R chin.  - No other lesions of concern on areas examined.     Medications:  Current Outpatient Medications   Medication Sig Dispense Refill    hydrocortisone 2.5 % cream Apply topically 2 times daily When rash is flared only then stop 30 g 2    ketoconazole (NIZORAL) 2 % external cream Apply  daily to skin folds where rash is prone 60 g 11    levothyroxine (SYNTHROID/LEVOTHROID) 50 MCG tablet TAKE 1 TABLET BY MOUTH DAILY 90 tablet 1    metroNIDAZOLE (METROCREAM) 0.75 % external cream Apply topically 2 times daily To cheek 45 g 11    multivitamin w/minerals (MULTIVITAMINS W/MINERALS) tablet Take 1 tablet by mouth daily      fexofenadine (ALLEGRA) 60 MG tablet Take 180 mg by mouth 2 times daily       No current facility-administered medications for this visit.      Past Medical History:   Patient Active Problem List   Diagnosis    Contraceptive management    Other acne    Hematuria    Hypothyroidism due to Hashimoto's thyroiditis    Cold sore    Female stress incontinence    Hashimoto's thyroiditis    Raynaud's disease    Atypical chest pain    Capillary hemangioma of skin    Cramping of hands    Generalized weakness    Tiredness    Touch sensitivity increased    Oral herpes simplex infection     Past Medical History:   Diagnosis Date    Other acne     Thyroid disease 11/17/2017    Unspecified contraceptive management     Nuva ring        HAYLEY Drake, APRN CNP  [address] [facility] [place] [address]

## 2024-06-26 NOTE — LETTER
2024       RE: Kaley Hart  [address]  [place] [address]     Dear Colleague,    Thank you for referring your patient, Kaley Hart, to the [facility] DERMATOLOGY CLINIC [place] at [facility]. Please see a copy of my visit note below.      Laser- VBeam(Pulsed Dye Laser) Procedure Note: Cosmetic      Dermatology Problem List:  0. NUB, R chin  1. Rosacea vs post-inflammatory telangiectasias  - Metrocream BID  2. Intertrigo  - hydrocortisone 2.5% BID      Procedure Date: 2024    Attending Staff Surgeon: Dr Chantel Mendez    Resident Surgeon: n/a     Assistant: Breonna Mancilla CMA     Operating Room Data:     Surgery/Procedure Date:    SAME     Pre-operative Diagnosis:   Telangectasias  Location: R cheek  Number of lesions: 1    Operation/Procedure    Vbeam pulsed dye laser treatment#: 1      Post-operative Diagnosis:  SAME    Laser Settings:  Energy:7 J/cm2  Spot size:10mm  Pulse width:  6 mS (0.45 thru 40 mS)  Dynamic cooling spray settin mS  Dynamic cooling device delay:  20 mS      Fotofinder photos: No    Anesthesia:  None    Description of Operation/Procedure:   The nature and purpose of the procedure, associated risks, possible consequences, complications and alternative methods of treatment were explained in detail, this includes but is not limited to hyperpigmentation, hypopigmentation, scarring, bruising, hair loss pain/discomfort, eye injury,   and blister. We reviewed that the outcome could be any of the following: no improvement, slight improvement or change in skin color & texture, the skin might be permanently lighter or darker, and though uncommon, superficial scarring may occur.  Multiple treatments may be recommended.     A photo and operative consent were obtained. Time-out was performed.The patient was positioned to optimally expose the area treated. Protective eyewear was worn by the patient and goggles on  all personnel in the treatment room. The patient confirmed the site to be treated. The laser energy output was verified by meter reading.      The clinically evident lesion(s) was/were treated with Jenn Vbeam pulsed dye laser (595 nm) beam as above. A total of 144 pulses were used. The patient tolerated the procedure well and no complications were noted. Post operative instructions were provided. The total laser operation and preparation time was <15 minutes.  The patient was counseled to call immediately for any issues and read the after visit summary for emergency contact information. The patient was counseled that [employer] messaging response may be delayed by several days, therefore, phone is preferred for emergency issues.     The patient will follow-up in 4-12 weeks for milia and PDL    The patient will pay the cosmetic fee today.         Staff Involved:  Scribe/Staff  Scribe Disclosure:   I, Anna Reno, am serving as a scribe to document services personally performed by Chantel Mendez MD based on data collection and the provider's statements to me.     Provider Disclosure:   The documentation recorded by the scribe accurately reflects the services I personally performed and the decisions made by me.    Chantel Mendez MD    Department of Dermatology  [facility]: Phone: [phone], Fax:[phone]  [facility]: Phone: [phone], Fax: [phone]

## 2024-06-26 NOTE — PATIENT INSTRUCTIONS
Pulsed Dye Laser (PDL)    I will experience redness, swelling, pain, and heat sensation. I may experience bruising, itching, or acne. Risks are blistering, oozing, permanent scarring, hair loss, temporary or permanent skin lightening or darkening, infection, and eye injury. I understand my outcome could be no improvement, slight improvement. Multiple treatments may be required.    After treatment, Do Not:  Rub, scratch, or put weight on the site for 2 weeks  Wear tight fitting clothing or jewelry over the site  Romano. Keep the site out of sunlight. Use sunscreen of 30 SPF or greater when in the sun. Use sunscreen 30 minutes before going out and reapply if sweating. Tanning decreases the success of the treatment    How do I care for the treated site?  Use ice packs for 10-20 minutes after the procedure for swelling   If the site is on your face, use ice again 1 hour after treatment for ten minutes and repeat again before bed. Do not burn the skin with the ice.   If a scab or crust forms, gently cleanse the site with water. Then put on Vaseline  ointment 3 times a day and contact the clinic   If a blister forms, contact the clinic by phone  If you have concerns about swelling, call the clinic  Avoid sun exposure and do not get tan as this can darken the treated area, use sunscreen  Do not use makeup on any open wound  Do not come to your next treatment with a tan    What should I expect?  Mild swelling  Blue-purple color that may take 2 to 3 weeks to go away  Redness may also last a week or longer  Results may take up to 3 or 4 months after treatment  More procedures may be needed    Who should I call with questions?  [facility]: [phone]  [facility]: [phone]  For urgent needs outside of business hours call the [facility] at [phone] and ask for the dermatology resident on call  [employer] messaging response may be delayed, please call for  urgent issues     Wound Care After a Biopsy    What is a skin biopsy?  A skin biopsy allows the doctor to examine a very small piece of tissue under the microscope to determine the diagnosis and the best treatment for the skin condition. A local anesthetic (numbing medicine) is injected with a very small needle into the skin area to be tested. A small piece of skin is taken from the area. Sometimes a suture (stitch) is used.     What are the risks of a skin biopsy?  I will experience scar, bleeding, swelling, pain, crusting and redness. I may experience incomplete removal or recurrence. Risks of this procedure are excessive bleeding, bruising, infection, nerve damage, numbness, thick (hypertrophic or keloidal) scar and non-diagnostic biopsy.    How should I care for my wound for the first 24 hours?  Keep the wound dry and covered for 24 hours  If it bleeds, hold direct pressure on the area for 15 minutes. If bleeding does not stop, call us or go to the emergency room  Avoid strenuous exercise the first 1-2 days or as your doctor instructs you    How should I care for the wound after 24 hours?  After 24 hours, remove the bandage  You may bathe or shower as normal  If you had a scalp biopsy, you can shampoo as usual and can use shower water to clean the biopsy site daily  Clean the wound once a day with gentle soap and water  Do not scrub, be gentle  Apply white petroleum/Vaseline after cleaning the wound with a cotton swab or a clean finger, and keep the site covered with a Bandaid /bandage. Bandages are not necessary with a scalp biopsy  If you are unable to cover the site with a Bandaid /bandage, re-apply ointment 2-3 times a day to keep the site moist. Moisture will help with healing  Avoid strenuous activity for first 1-2 days  Avoid lakes, rivers, pools, and oceans until the stitches are removed or the site is healed    How do I clean my wound?  Wash hands thoroughly with soap or use hand  before all  wound care  Clean the wound with gentle soap and water  Apply white petroleum/Vaseline  to wound after it is clean  Replace the Bandaid /bandage to keep the wound covered for the first few days or as instructed by your doctor  If you had a scalp biopsy, warm shower water to the area on a daily basis should suffice    What should I use to clean my wound?   Cotton-tipped applicators (Qtips )  White petroleum jelly (Vaseline ). Use a clean new container and use Q-tips to apply.  Bandaids  as needed  Gentle soap     How should I care for my wound long term?  Do not get your wound dirty  Keep up with wound care for one week or until the area is healed.     A small scab will form and fall off by itself when the area is completely healed. The area will be red and will become pink in color as it heals. Sun protection is very important for how your scar will turn out. Sunscreen with an SPF 30 or greater is recommended once the area is healed.  You should have some soreness but it should be mild and slowly go away over several days. Talk to your doctor about using tylenol for pain,    When should I call my doctor?  If you have increased:   Pain or swelling  Pus or drainage (clear or slightly yellow drainage is ok)  Temperature over 100F  Spreading redness or warmth around wound    When will I hear about my results?  The biopsy results can take 2 weeks to come back.  Your results will automatically release to [employer] before your provider has even reviewed them.  The clinic will call you with the results, send you a [employer] message, or have you schedule a follow-up clinic or phone time to discuss the results.  Contact our clinics if you do not hear from us in 2 weeks.    Who should I call with questions?  [facility]: [phone]  [facility]: [phone]  For urgent needs outside of business hours call the [facility] at [phone] and ask for the  dermatology resident on call

## 2024-06-26 NOTE — PATIENT INSTRUCTIONS
Wound Care After a Biopsy    What is a skin biopsy?  A skin biopsy allows the doctor to examine a very small piece of tissue under the microscope to determine the diagnosis and the best treatment for the skin condition. A local anesthetic (numbing medicine) is injected with a very small needle into the skin area to be tested. A small piece of skin is taken from the area. Sometimes a suture (stitch) is used.     What are the risks of a skin biopsy?  I will experience scar, bleeding, swelling, pain, crusting and redness. I may experience incomplete removal or recurrence. Risks of this procedure are excessive bleeding, bruising, infection, nerve damage, numbness, thick (hypertrophic or keloidal) scar and non-diagnostic biopsy.    How should I care for my wound for the first 24 hours?  Keep the wound dry and covered for 24 hours  If it bleeds, hold direct pressure on the area for 15 minutes. If bleeding does not stop, call us or go to the emergency room  Avoid strenuous exercise the first 1-2 days or as your doctor instructs you    How should I care for the wound after 24 hours?  After 24 hours, remove the bandage  You may bathe or shower as normal  If you had a scalp biopsy, you can shampoo as usual and can use shower water to clean the biopsy site daily  Clean the wound once a day with gentle soap and water  Do not scrub, be gentle  Apply white petroleum/Vaseline after cleaning the wound with a cotton swab or a clean finger, and keep the site covered with a Bandaid /bandage. Bandages are not necessary with a scalp biopsy  If you are unable to cover the site with a Bandaid /bandage, re-apply ointment 2-3 times a day to keep the site moist. Moisture will help with healing  Avoid strenuous activity for first 1-2 days  Avoid lakes, rivers, pools, and oceans until the stitches are removed or the site is healed    How do I clean my wound?  Wash hands thoroughly with soap or use hand  before all wound care  Clean  the wound with gentle soap and water  Apply white petroleum/Vaseline  to wound after it is clean  Replace the Bandaid /bandage to keep the wound covered for the first few days or as instructed by your doctor  If you had a scalp biopsy, warm shower water to the area on a daily basis should suffice    What should I use to clean my wound?   Cotton-tipped applicators (Qtips )  White petroleum jelly (Vaseline ). Use a clean new container and use Q-tips to apply.  Bandaids  as needed  Gentle soap     How should I care for my wound long term?  Do not get your wound dirty  Keep up with wound care for one week or until the area is healed.  If you have stitches, stitches need to be removed in 0 days. You may return to our clinic for this or you may have it done locally at your doctor s office.  A small scab will form and fall off by itself when the area is completely healed. The area will be red and will become pink in color as it heals. Sun protection is very important for how your scar will turn out. Sunscreen with an SPF 30 or greater is recommended once the area is healed.  You should have some soreness but it should be mild and slowly go away over several days. Talk to your doctor about using tylenol for pain,    When should I call my doctor?  If you have increased:   Pain or swelling  Pus or drainage (clear or slightly yellow drainage is ok)  Temperature over 100F  Spreading redness or warmth around wound    When will I hear about my results?  The biopsy results can take 2 weeks to come back.  Your results will automatically release to [employer] before your provider has even reviewed them.  The clinic will call you with the results, send you a [employer] message, or have you schedule a follow-up clinic or phone time to discuss the results.  Contact our clinics if you do not hear from us in 2 weeks.    Who should I call with questions?  [facility]: [phone]  [facility]  [place]: [phone]  For urgent needs outside of business hours call the [facility] at [phone] and ask for the dermatology resident on call

## 2024-06-28 LAB
PATH REPORT.COMMENTS IMP SPEC: NORMAL
PATH REPORT.COMMENTS IMP SPEC: NORMAL
PATH REPORT.FINAL DX SPEC: NORMAL
PATH REPORT.GROSS SPEC: NORMAL
PATH REPORT.MICROSCOPIC SPEC OTHER STN: NORMAL
PATH REPORT.MICROSCOPIC SPEC OTHER STN: NORMAL
PATH REPORT.RELEVANT HX SPEC: NORMAL

## 2024-06-28 NOTE — RESULT ENCOUNTER NOTE
Dear Kaley Hart,    We are writing to inform you of your test results that show a normal mole.     Thank you for taking the time to be seen in our dermatology clinic. If you have further questions or concerns, please contact the clinic(see phone number listed below).      Sincerely,    Chantel Mendez MD    Department of Dermatology  [facility]: Phone: [phone], Fax:[phone]  [facility]: Phone: [phone], Fax: [phone]

## 2024-07-03 ENCOUNTER — MYC REFILL (OUTPATIENT)
Dept: FAMILY MEDICINE | Facility: CLINIC | Age: 44
End: 2024-07-03
Payer: COMMERCIAL

## 2024-07-03 DIAGNOSIS — E06.3 HYPOTHYROIDISM DUE TO HASHIMOTO'S THYROIDITIS: ICD-10-CM

## 2024-07-03 RX ORDER — LEVOTHYROXINE SODIUM 50 UG/1
TABLET ORAL
Qty: 60 TABLET | Refills: 0 | Status: SHIPPED | OUTPATIENT
Start: 2024-07-03

## 2024-07-03 RX ORDER — LEVOTHYROXINE SODIUM 50 UG/1
TABLET ORAL
Qty: 90 TABLET | OUTPATIENT
Start: 2024-07-03

## 2024-09-04 DIAGNOSIS — E06.3 HYPOTHYROIDISM DUE TO HASHIMOTO'S THYROIDITIS: ICD-10-CM

## 2024-09-05 ENCOUNTER — DOCUMENTATION ONLY (OUTPATIENT)
Dept: FAMILY MEDICINE | Facility: CLINIC | Age: 44
End: 2024-09-05
Payer: COMMERCIAL

## 2024-09-05 RX ORDER — LEVOTHYROXINE SODIUM 50 UG/1
TABLET ORAL
Qty: 60 TABLET | Refills: 0 | OUTPATIENT
Start: 2024-09-05

## 2024-09-05 NOTE — PROGRESS NOTES
Kaley Hart has an upcoming lab appointment:    Future Appointments   Date Time Provider Department Center   9/6/2024  1:30 PM [facility] LAB [facility]   10/9/2024 11:45 AM Chantel Mendez MD [facility]     Patient is scheduled for the following lab(s): Thyroid testing    There is no order available. Please review and place either future orders or HMPO (Review of Health Maintenance Protocol Orders), as appropriate.    Health Maintenance Due   Topic    TSH W/FREE T4 REFLEX     ANNUAL REVIEW OF HM ORDERS      Yolis Swenson

## 2024-09-06 NOTE — TELEPHONE ENCOUNTER
Y chart message sent to patient. She was reminded to schedule appointment for future refills.       Marva Quiros RN  [facility]

## 2024-09-06 NOTE — PROGRESS NOTES
I have not seen pt after 6/2023.   Pt needs appointment with PCP for discussing on her medications and medical conditions.

## 2024-09-06 NOTE — TELEPHONE ENCOUNTER
I have not seen pt after 2023 as pt has done Physical with outside facility OBGYN in 2024.     OK TO SUBMIT E-VISIT THROUGH [employer] FOR LAB CHECK AND REFILLS     Thank you  Savita Jerome MD on 9/5/2024

## 2024-09-10 ENCOUNTER — LAB REQUISITION (OUTPATIENT)
Dept: LAB | Facility: CLINIC | Age: 44
End: 2024-09-10
Payer: COMMERCIAL

## 2024-09-10 DIAGNOSIS — R52 PAIN, UNSPECIFIED: ICD-10-CM

## 2024-09-10 DIAGNOSIS — Z13.220 ENCOUNTER FOR SCREENING FOR LIPOID DISORDERS: ICD-10-CM

## 2024-09-10 DIAGNOSIS — E06.3 AUTOIMMUNE THYROIDITIS: ICD-10-CM

## 2024-09-10 DIAGNOSIS — Z13.1 ENCOUNTER FOR SCREENING FOR DIABETES MELLITUS: ICD-10-CM

## 2024-09-10 LAB
CHOLEST SERPL-MCNC: 173 MG/DL
CRP SERPL-MCNC: <3 MG/L
FASTING STATUS PATIENT QL REPORTED: YES
HBA1C MFR BLD: 5.4 %
HDLC SERPL-MCNC: 61 MG/DL
HOLD SPECIMEN: NORMAL
LDLC SERPL CALC-MCNC: 105 MG/DL
NONHDLC SERPL-MCNC: 112 MG/DL
T3 SERPL-MCNC: 83 NG/DL (ref 85–202)
T4 FREE SERPL-MCNC: 1.42 NG/DL (ref 0.9–1.7)
TRIGL SERPL-MCNC: 36 MG/DL
TSH SERPL DL<=0.005 MIU/L-ACNC: 1.88 UIU/ML (ref 0.3–4.2)

## 2024-09-10 PROCEDURE — 84439 ASSAY OF FREE THYROXINE: CPT | Mod: ORL | Performed by: FAMILY MEDICINE

## 2024-09-10 PROCEDURE — 83036 HEMOGLOBIN GLYCOSYLATED A1C: CPT | Mod: ORL | Performed by: FAMILY MEDICINE

## 2024-09-10 PROCEDURE — 84443 ASSAY THYROID STIM HORMONE: CPT | Mod: ORL | Performed by: FAMILY MEDICINE

## 2024-09-10 PROCEDURE — 84480 ASSAY TRIIODOTHYRONINE (T3): CPT | Mod: ORL | Performed by: FAMILY MEDICINE

## 2024-09-10 PROCEDURE — 80061 LIPID PANEL: CPT | Mod: ORL | Performed by: FAMILY MEDICINE

## 2024-09-10 PROCEDURE — 86140 C-REACTIVE PROTEIN: CPT | Mod: ORL | Performed by: FAMILY MEDICINE

## 2024-09-10 PROCEDURE — 86376 MICROSOMAL ANTIBODY EACH: CPT | Mod: ORL | Performed by: FAMILY MEDICINE

## 2024-09-11 LAB — THYROPEROXIDASE AB SERPL-ACNC: 313 IU/ML

## 2025-03-02 ENCOUNTER — HEALTH MAINTENANCE LETTER (OUTPATIENT)
Age: 45
End: 2025-03-02

## 2025-04-30 ENCOUNTER — OFFICE VISIT (OUTPATIENT)
Dept: DERMATOLOGY | Facility: CLINIC | Age: 45
End: 2025-04-30
Payer: COMMERCIAL

## 2025-04-30 DIAGNOSIS — L30.4 INTERTRIGO: ICD-10-CM

## 2025-04-30 DIAGNOSIS — L81.4 LENTIGINES: Primary | ICD-10-CM

## 2025-04-30 DIAGNOSIS — Z12.83 ENCOUNTER FOR SCREENING FOR MALIGNANT NEOPLASM OF SKIN: ICD-10-CM

## 2025-04-30 DIAGNOSIS — D22.9 MULTIPLE BENIGN NEVI: ICD-10-CM

## 2025-04-30 DIAGNOSIS — D18.01 CHERRY ANGIOMA: ICD-10-CM

## 2025-04-30 DIAGNOSIS — L91.8 INFLAMED ACROCHORDON: ICD-10-CM

## 2025-04-30 DIAGNOSIS — L82.1 SEBORRHEIC KERATOSES: ICD-10-CM

## 2025-04-30 RX ORDER — HYDROCORTISONE 25 MG/G
CREAM TOPICAL 2 TIMES DAILY
Qty: 30 G | Refills: 2 | Status: SHIPPED | OUTPATIENT
Start: 2025-04-30

## 2025-04-30 RX ORDER — KETOCONAZOLE 20 MG/G
CREAM TOPICAL
Qty: 60 G | Refills: 11 | Status: SHIPPED | OUTPATIENT
Start: 2025-04-30

## 2025-04-30 NOTE — LETTER
4/30/2025      Kaley Hart  [address]  [place] [address]      Dear Colleague,    Thank you for referring your patient, Kaley Hart, to the [facility] [place]. Please see a copy of my visit note below.    [facility] Dermatology Note  Encounter Date: Apr 30, 2025  Office Visit     Reviewed patients past medical history and pertinent chart review prior to patients visit today.     CC: Skin Check (1.  FBSC/2.  Skin tags in arm pit making hard to shave /3.   Spot check Dark sensitive moles on left thigh and right thigh, belly button ring flared up red,  increased red spots all over body /4.  Itchiness in groin area tried steroid cream which helped some but is now back)    History of present illness  Kaley Hart, a 44-year-old female, is here for a full body skin cancer screening. She reports having some skin tags in the armpits, spots of concern on the back and thighs, and itchiness in the groin.  Kaley notes intermittent rash and itchiness in the inguinal folds, which seem to flare more in warmer weather. She has been using ketoconazole 2% cream and hydrocortisone 2.5% cream to manage these symptoms, which does tend to help when she uses them    Past medical history  -Skin tags, cryo 4/30/2025 left axilla  - Patient denies personal history of skin cancer or dysplastic nevi.       Family history  - Family history of basal cell carcinoma in her mother     Social history  -   - Three children aged 17, 15, and 6     Physical exam  Skin: Total skin excluding the genitalia areas was performed. The exam included the head/face, neck, both arms, chest, back, abdomen, both legs, digits, mons pubis, buttock and nails.   -several 1-2mm red dome shaped symmetric papules scattered on the trunk  -multiple tan/brown flat round macules and raised papules scattered throughout trunk, extremities and head. No worrisome features for malignancy noted on  examination.  -scattered tan, homogenous macules scattered on sun exposed areas of trunk, extremities and face.   -scattered waxy, stuck on tan/brown papules and patches on the trunk    - In the left axilla, 3 pedunculated skin-colored papules, 1 to 2 mm in size, identified as irritated acrochordons     Assessment/Plan  - Irritated skin tags (acrochordons). Discussed treatment options with patient including no treatment, cryotherapy, and shave removal. Patient prefers cryotherapy today due to irritation. After verbal consent and discussion of risks and benefits including but no limited to dyspigmentation/scar, blister, and pain, 3 was(were) treated with  2 cycles with liquid nitrogen. Post cryotherapy instructions were provided.      - Intertrigo. Intermittent. Discussed that this is very common in the skin folds and is due to the areas being warm and moist.  Discussed techniques to ensure area stays as dry as possible but that this is not always possible.  I gave patient prescription for ketoconazole 2% cream as well as hydrocortisone 2.5% cream to apply 1-2 times daily until rash resolves. When rash is fully resolved okay to continue ketoconazole as much as needed to keep rash from flaring as it will tend to be recurrent for most people. Councled about use and side effects of thinning of the skin, striae, erythema, and worsening of rash with steroid use.        - Benign skin findings including: seborrheic keratoses, cherry angioma, lentigines and benign nevi.   - No further intervention required. Patient to report changes.   - Patient reassured of the benign nature of these lesions.    -Signs and Symptoms of non-melanoma skin cancer and ABCDEs of melanoma reviewed with patient. Patient encouraged to perform monthly self skin exams and educated on how to perform them. UV precautions reviewed with patient. Patient was asked about new or changing moles/lesions on body.     -Reviewed Sunscreen: Apply 20 minutes prior  to going outdoors and reapply every two hours, when wet or sweating. We recommend using an SPF 30 or higher, and to use one that is water resistant.       Follow-up:  1-2 years for follow up full body skin exam, prn for new or changing lesions or new concerns     Medications:  Current Outpatient Medications   Medication Sig Dispense Refill     hydrocortisone 2.5 % cream Apply topically 2 times daily When rash is flared only then stop 30 g 2     ketoconazole (NIZORAL) 2 % external cream Apply daily to skin folds where rash is prone 60 g 11     levothyroxine (SYNTHROID/LEVOTHROID) 50 MCG tablet TAKE 1 TABLET BY MOUTH DAILY 60 tablet 0     metroNIDAZOLE (METROCREAM) 0.75 % external cream Apply topically 2 times daily To cheek 45 g 11     multivitamin w/minerals (MULTIVITAMINS W/MINERALS) tablet Take 1 tablet by mouth daily       No current facility-administered medications for this visit.        Allergies:   Allergies   Allergen Reactions     Codeine Anaphylaxis and Other (See Comments)     Molds & Smuts Other (See Comments), Anaphylaxis and Shortness Of Breath     Other reaction(s): Abnormal breathing     Cholesterol Support Other (See Comments)     Mold Difficulty breathing and Other (See Comments)     Seasonal Allergies Other (See Comments)     Amoxicillin-Pot Clavulanate Palpitations     Chest pain and palpitations       Cande Drake CNP   Dermatology      This note has been generated using dictation services and may contain typos.    CC Penelope Phan MD  No address on file on close of this encounter.       Again, thank you for allowing me to participate in the care of your patient.        Sincerely,        LUIS ANGEL Villaseñor CNP    Electronically signed

## 2025-04-30 NOTE — PATIENT INSTRUCTIONS
Proper skin care from [facility]:    -Eliminate harsh soaps as they strip the natural oils from the skin, often resulting in dry itchy skin ( i.e. Dial, Zest, Cymro Spring)  -Use mild soaps such as Cetaphil or Dove Sensitive Skin in the shower. You do not need to use soap on arms, legs, and trunk every time you shower unless visibly soiled.   -Avoid hot or cold showers.  -After showering, lightly dry off and apply moisturizing within 2-3 minutes. This will help trap moisture in the skin.   -Aggressive use of a moisturizer at least 1-2 times a day to the entire body (including -Vanicream, Cetaphil, Aquaphor or Cerave) and moisturize hands after every washing.  -We recommend using moisturizers that come in a tub that needs to be scooped out, not a pump. This has more of an oil base. It will hold moisture in your skin much better than a water base moisturizer. The above recommended are non-pore clogging.      Wear a sunscreen with at least SPF 30 on your face, ears, neck and V of the chest daily. Wear sunscreen on other areas of the body if those areas are exposed to the sun throughout the day. Sunscreens can contain physical and/or chemical blockers. Physical blockers are less likely to clog pores, these include zinc oxide and titanium dioxide. Reapply every two hour and after swimming.     Sunscreen examples: https://www.ewg.org/sunscreen/    UV radiation  UVA radiation remains constant throughout the day and throughout the year. It is a longer wavelength than UVB and therefore penetrates deeper into the skin leading to immediate and delayed tanning, photoaging, and skin cancer. 70-80% of UVA and UVB radiation occurs between the hours of 10am-2pm.  UVB radiation  UVB radiation causes the most harmful effects and is more significant during the summer months. However, snow and ice can reflect UVB radiation leading to skin damage during the winter months as well. UVB radiation is responsible for tanning,  burning, inflammation, delayed erythema (pinkness), pigmentation (brown spots), and skin cancer.     I recommend self monthly full body exams and yearly full body exams with a dermatology provider. If you develop a new or changing lesion please follow up for examination. Most skin cancers are pink and scaly or pink and pearly. However, we do see blue/brown/black skin cancers.  Consider the ABCDEs of melanoma when giving yourself your monthly full body exam ( don't forget the groin, buttocks, feet, toes, etc). A-asymmetry, B-borders, C-color, D-diameter, E-elevation or evolving. If you see any of these changes please follow up in clinic. If you cannot see your back I recommend purchasing a hand held mirror to use with a larger wall mirror.       Checking for Skin Cancer  You can find cancer early by checking your skin each month. There are 3 kinds of skin cancer. They are melanoma, basal cell carcinoma, and squamous cell carcinoma. Doing monthly skin checks is the best way to find new marks or skin changes. Follow the instructions below for checking your skin.   The ABCDEs of checking moles for melanoma   Check your moles or growths for signs of melanoma using ABCDE:   Asymmetry: the sides of the mole or growth don t match  Border: the edges are ragged, notched, or blurred  Color: the color within the mole or growth varies  Diameter: the mole or growth is larger than 6 mm (size of a pencil eraser)  Evolving: the size, shape, or color of the mole or growth is changing (evolving is not shown in the images below)    Checking for other types of skin cancer  Basal cell carcinoma or squamous cell carcinoma have symptoms such as:     A spot or mole that looks different from all other marks on your skin  Changes in how an area feels, such as itching, tenderness, or pain  Changes in the skin's surface, such as oozing, bleeding, or scaliness  A sore that does not heal  New swelling or redness beyond the border of a  mole    Who s at risk?  Anyone can get skin cancer. But you are at greater risk if you have:   Fair skin, light-colored hair, or light-colored eyes  Many moles or abnormal moles on your skin  A history of sunburns from sunlight or tanning beds  A family history of skin cancer  A history of exposure to radiation or chemicals  A weakened immune system  If you have had skin cancer in the past, you are at risk for recurring skin cancer.   How to check your skin  Do your monthly skin checkups in front of a full-length mirror. Check all parts of your body, including your:   Head (ears, face, neck, and scalp)  Torso (front, back, and sides)  Arms (tops, undersides, upper, and lower armpits)  Hands (palms, backs, and fingers, including under the nails)  Buttocks and genitals  Legs (front, back, and sides)  Feet (tops, soles, toes, including under the nails, and between toes)  If you have a lot of moles, take digital photos of them each month. Make sure to take photos both up close and from a distance. These can help you see if any moles change over time.   Most skin changes are not cancer. But if you see any changes in your skin, call your doctor right away. Only he or she can diagnose a problem. If you have skin cancer, seeing your doctor can be the first step toward getting the treatment that could save your life.   [employer] last reviewed this educational content on 4/1/2019 2000-2020 [employer]. [address] [place] [address], [place] [address]. All rights reserved. This information is not intended as a substitute for professional medical care. Always follow your healthcare professional's instructions.       When should I call my doctor?  If you are worsening or not improving, please, contact us or seek urgent care as noted below.     Who should I call with questions (adults)?    [facility] [phone]  For urgent needs outside of business hours call the [facility] at  [phone] and ask for the dermatology resident on call to be paged  If this is a medical emergency and you are unable to reach an ER, Call [phone]      If you need a prescription refill, please contact your pharmacy. Refills are approved or denied by our Physicians during normal business hours, Monday through Friday.  Per office policy, refills will not be granted if you have not been seen within the past year (or sooner depending on the condition).

## 2025-04-30 NOTE — PROGRESS NOTES
[facility] Dermatology Note  Encounter Date: Apr 30, 2025  Office Visit     Reviewed patients past medical history and pertinent chart review prior to patients visit today.     CC: Skin Check (1.  FBSC/2.  Skin tags in arm pit making hard to shave /3.   Spot check Dark sensitive moles on left thigh and right thigh, belly button ring flared up red,  increased red spots all over body /4.  Itchiness in groin area tried steroid cream which helped some but is now back)    History of present illness  Kaley Hart, a 44-year-old female, is here for a full body skin cancer screening. She reports having some skin tags in the armpits, spots of concern on the back and thighs, and itchiness in the groin.  Kaley notes intermittent rash and itchiness in the inguinal folds, which seem to flare more in warmer weather. She has been using ketoconazole 2% cream and hydrocortisone 2.5% cream to manage these symptoms, which does tend to help when she uses them    Past medical history  -Skin tags, cryo 4/30/2025 left axilla  - Patient denies personal history of skin cancer or dysplastic nevi.       Family history  - Family history of basal cell carcinoma in her mother     Social history  -   - Three children aged 17, 15, and 6     Physical exam  Skin: Total skin excluding the genitalia areas was performed. The exam included the head/face, neck, both arms, chest, back, abdomen, both legs, digits, mons pubis, buttock and nails.   -several 1-2mm red dome shaped symmetric papules scattered on the trunk  -multiple tan/brown flat round macules and raised papules scattered throughout trunk, extremities and head. No worrisome features for malignancy noted on examination.  -scattered tan, homogenous macules scattered on sun exposed areas of trunk, extremities and face.   -scattered waxy, stuck on tan/brown papules and patches on the trunk    - In the left axilla, 3 pedunculated skin-colored papules, 1 to 2 mm in  size, identified as irritated acrochordons     Assessment/Plan  - Irritated skin tags (acrochordons). Discussed treatment options with patient including no treatment, cryotherapy, and shave removal. Patient prefers cryotherapy today due to irritation. After verbal consent and discussion of risks and benefits including but no limited to dyspigmentation/scar, blister, and pain, 3 was(were) treated with  2 cycles with liquid nitrogen. Post cryotherapy instructions were provided.      - Intertrigo. Intermittent. Discussed that this is very common in the skin folds and is due to the areas being warm and moist.  Discussed techniques to ensure area stays as dry as possible but that this is not always possible.  I gave patient prescription for ketoconazole 2% cream as well as hydrocortisone 2.5% cream to apply 1-2 times daily until rash resolves. When rash is fully resolved okay to continue ketoconazole as much as needed to keep rash from flaring as it will tend to be recurrent for most people. Councled about use and side effects of thinning of the skin, striae, erythema, and worsening of rash with steroid use.        - Benign skin findings including: seborrheic keratoses, cherry angioma, lentigines and benign nevi.   - No further intervention required. Patient to report changes.   - Patient reassured of the benign nature of these lesions.    -Signs and Symptoms of non-melanoma skin cancer and ABCDEs of melanoma reviewed with patient. Patient encouraged to perform monthly self skin exams and educated on how to perform them. UV precautions reviewed with patient. Patient was asked about new or changing moles/lesions on body.     -Reviewed Sunscreen: Apply 20 minutes prior to going outdoors and reapply every two hours, when wet or sweating. We recommend using an SPF 30 or higher, and to use one that is water resistant.       Follow-up:  1-2 years for follow up full body skin exam, prn for new or changing lesions or new  concerns     Medications:  Current Outpatient Medications   Medication Sig Dispense Refill    hydrocortisone 2.5 % cream Apply topically 2 times daily When rash is flared only then stop 30 g 2    ketoconazole (NIZORAL) 2 % external cream Apply daily to skin folds where rash is prone 60 g 11    levothyroxine (SYNTHROID/LEVOTHROID) 50 MCG tablet TAKE 1 TABLET BY MOUTH DAILY 60 tablet 0    metroNIDAZOLE (METROCREAM) 0.75 % external cream Apply topically 2 times daily To cheek 45 g 11    multivitamin w/minerals (MULTIVITAMINS W/MINERALS) tablet Take 1 tablet by mouth daily       No current facility-administered medications for this visit.        Allergies:   Allergies   Allergen Reactions    Codeine Anaphylaxis and Other (See Comments)    Molds & Smuts Other (See Comments), Anaphylaxis and Shortness Of Breath     Other reaction(s): Abnormal breathing    Cholesterol Support Other (See Comments)    Mold Difficulty breathing and Other (See Comments)    Seasonal Allergies Other (See Comments)    Amoxicillin-Pot Clavulanate Palpitations     Chest pain and palpitations       Cande Drake CNP   Dermatology      This note has been generated using dictation services and may contain typos.    CC Referred Self, MD  No address on file on close of this encounter.

## (undated) DEVICE — ESU GROUND PAD ADULT W/CORD E7507

## (undated) DEVICE — BAG CLEAR TRASH 1.3M 39X33" P4040C

## (undated) DEVICE — SOL NACL 0.9% INJ 1000ML BAG 2B1324X

## (undated) DEVICE — SU VICRYL 4-0 PS-2 18" UND J496H

## (undated) DEVICE — STPL POWERED ECHELON 45MM PSEE45A

## (undated) DEVICE — ESU CORD MONOPOLAR 10'  E0510

## (undated) DEVICE — SUCTION MANIFOLD NEPTUNE 2 SYS 4 PORT 0702-020-000

## (undated) DEVICE — SUCTION IRR STRYKERFLOW II W/TIP 250-070-520

## (undated) DEVICE — GLOVE PROTEXIS POWDER FREE SMT 6.5  2D72PT65X

## (undated) DEVICE — ENDO TROCAR FIRST ENTRY KII FIOS Z-THRD 05X100MM CTF03

## (undated) DEVICE — LINEN POUCH DBL 5427

## (undated) DEVICE — GLOVE PROTEXIS BLUE W/NEU-THERA 7.0  2D73EB70

## (undated) DEVICE — ESU ELEC BLADE 2.75" COATED/INSULATED E1455

## (undated) DEVICE — GLOVE PROTEXIS MICRO 6.5  2D73PM65

## (undated) DEVICE — LINEN DRAPE 54X72" 5467

## (undated) DEVICE — GLOVE PROTEXIS BLUE W/NEU-THERA 6.5  2D73EB65

## (undated) DEVICE — ENDO POUCH UNIV RETRIEVAL SYSTEM INZII 10MM CD001

## (undated) DEVICE — LINEN HALF SHEET 5512

## (undated) DEVICE — Device

## (undated) DEVICE — ENDO TROCAR SLEEVE KII Z-THREADED 05X100MM CTS02

## (undated) DEVICE — ESU PENCIL W/HOLSTER E2350H

## (undated) DEVICE — BLADE KNIFE SURG 11 371111

## (undated) DEVICE — STPL ENDO RELOAD ECHELON 45X2.0MM GREY ECR45M

## (undated) DEVICE — LINEN FULL SHEET 5511

## (undated) DEVICE — LINEN TOWEL PACK X10 5473

## (undated) DEVICE — SU VICRYL 0 UR-6 27" J603H

## (undated) DEVICE — SOL NACL 0.9% IRRIG 1000ML BOTTLE 2F7124

## (undated) RX ORDER — ONDANSETRON 2 MG/ML
INJECTION INTRAMUSCULAR; INTRAVENOUS
Status: DISPENSED
Start: 2021-01-30

## (undated) RX ORDER — DEXAMETHASONE SODIUM PHOSPHATE 4 MG/ML
INJECTION, SOLUTION INTRA-ARTICULAR; INTRALESIONAL; INTRAMUSCULAR; INTRAVENOUS; SOFT TISSUE
Status: DISPENSED
Start: 2021-01-30

## (undated) RX ORDER — MEPERIDINE HYDROCHLORIDE 25 MG/ML
INJECTION INTRAMUSCULAR; INTRAVENOUS; SUBCUTANEOUS
Status: DISPENSED
Start: 2021-01-30

## (undated) RX ORDER — FENTANYL CITRATE 50 UG/ML
INJECTION, SOLUTION INTRAMUSCULAR; INTRAVENOUS
Status: DISPENSED
Start: 2021-01-30

## (undated) RX ORDER — NEOSTIGMINE METHYLSULFATE 1 MG/ML
VIAL (ML) INJECTION
Status: DISPENSED
Start: 2021-01-30

## (undated) RX ORDER — OXYCODONE HYDROCHLORIDE 5 MG/1
TABLET ORAL
Status: DISPENSED
Start: 2021-01-30

## (undated) RX ORDER — BUPIVACAINE HYDROCHLORIDE AND EPINEPHRINE 5; 5 MG/ML; UG/ML
INJECTION, SOLUTION EPIDURAL; INTRACAUDAL; PERINEURAL
Status: DISPENSED
Start: 2021-01-30

## (undated) RX ORDER — GLYCOPYRROLATE 0.2 MG/ML
INJECTION INTRAMUSCULAR; INTRAVENOUS
Status: DISPENSED
Start: 2021-01-30